# Patient Record
Sex: FEMALE | Race: OTHER | Employment: PART TIME | URBAN - METROPOLITAN AREA
[De-identification: names, ages, dates, MRNs, and addresses within clinical notes are randomized per-mention and may not be internally consistent; named-entity substitution may affect disease eponyms.]

---

## 2017-02-16 ENCOUNTER — HOSPITAL ENCOUNTER (OUTPATIENT)
Dept: LAB | Age: 42
Discharge: HOME | End: 2017-02-16

## 2017-02-16 ENCOUNTER — OFFICE VISIT (OUTPATIENT)
Dept: FAMILY MEDICINE CLINIC | Age: 42
End: 2017-02-16

## 2017-02-16 VITALS
HEIGHT: 62 IN | WEIGHT: 203 LBS | SYSTOLIC BLOOD PRESSURE: 184 MMHG | BODY MASS INDEX: 37.36 KG/M2 | HEART RATE: 66 BPM | TEMPERATURE: 97.8 F | DIASTOLIC BLOOD PRESSURE: 109 MMHG

## 2017-02-16 DIAGNOSIS — Z13.9 SCREENING: Primary | ICD-10-CM

## 2017-02-16 DIAGNOSIS — I10 ESSENTIAL HYPERTENSION: ICD-10-CM

## 2017-02-16 DIAGNOSIS — Z23 ENCOUNTER FOR IMMUNIZATION: ICD-10-CM

## 2017-02-16 LAB
ALBUMIN SERPL BCP-MCNC: 4 G/DL (ref 3.5–5)
ALBUMIN/GLOB SERPL: 1.1 {RATIO} (ref 1.1–2.2)
ALP SERPL-CCNC: 101 U/L (ref 45–117)
ALT SERPL-CCNC: 50 U/L (ref 12–78)
ANION GAP BLD CALC-SCNC: 9 MMOL/L (ref 5–15)
AST SERPL W P-5'-P-CCNC: 33 U/L (ref 15–37)
BILIRUB SERPL-MCNC: 0.2 MG/DL (ref 0.2–1)
BUN SERPL-MCNC: 13 MG/DL (ref 6–20)
BUN/CREAT SERPL: 18 (ref 12–20)
CALCIUM SERPL-MCNC: 8.7 MG/DL (ref 8.5–10.1)
CHLORIDE SERPL-SCNC: 104 MMOL/L (ref 97–108)
CHOLEST SERPL-MCNC: 223 MG/DL
CO2 SERPL-SCNC: 27 MMOL/L (ref 21–32)
CREAT SERPL-MCNC: 0.74 MG/DL (ref 0.55–1.02)
GLOBULIN SER CALC-MCNC: 3.7 G/DL (ref 2–4)
GLUCOSE POC: NORMAL MG/DL
GLUCOSE SERPL-MCNC: 77 MG/DL (ref 65–100)
HDLC SERPL-MCNC: 56 MG/DL
HDLC SERPL: 4 {RATIO} (ref 0–5)
LDLC SERPL CALC-MCNC: 148.4 MG/DL (ref 0–100)
LIPID PROFILE,FLP: ABNORMAL
POTASSIUM SERPL-SCNC: 4.4 MMOL/L (ref 3.5–5.1)
PROT SERPL-MCNC: 7.7 G/DL (ref 6.4–8.2)
SODIUM SERPL-SCNC: 140 MMOL/L (ref 136–145)
TRIGL SERPL-MCNC: 93 MG/DL (ref ?–150)
VLDLC SERPL CALC-MCNC: 18.6 MG/DL

## 2017-02-16 PROCEDURE — 80061 LIPID PANEL: CPT | Performed by: FAMILY MEDICINE

## 2017-02-16 PROCEDURE — 80053 COMPREHEN METABOLIC PANEL: CPT | Performed by: FAMILY MEDICINE

## 2017-02-16 RX ORDER — HYDROCHLOROTHIAZIDE 25 MG/1
25 TABLET ORAL DAILY
Qty: 90 TAB | Refills: 0 | Status: SHIPPED | OUTPATIENT
Start: 2017-02-16 | End: 2017-04-18 | Stop reason: SDUPTHER

## 2017-02-16 NOTE — MR AVS SNAPSHOT
Visit Information Parmjit Byrdjoselinh Personal Médico Departamento Teléfono del Dep. Número de visita 2/16/2017  8:45 AM Morgan Ayala MD [address] [phone] Follow-up Instructions Return for 2 months appt less busy site. Your Appointments 4/18/2017  9:00 AM  
Follow Up with Morgan Ayala MD  
[facility]- [address] ([facility]) Appt Note: 2 month follow up  
 [address] [facility] [address] [facility] 7 [address] [phone]  
  
   
 [address] [place] [address] [address] [facility] 7 [address] Upcoming Health Maintenance Date Due DTaP/Tdap/Td series (1 - Tdap) 12/15/1998 INFLUENZA AGE 9 TO ADULT 8/1/2016 PAP AKA CERVICAL CYTOLOGY 4/13/2019 Alergias  Review Complete El: 5/24/2016 Por: ROSSY Pike  
 A partir del:  2/16/2017 No Known Allergies Vacunas actuales Barbi Shoulders No hay ninguna vacuna archivada. No revisadas esta visita You Were Diagnosed With   
  
 Clent Oas Screening    -  Primary ICD-10-CM: Z13.9 ICD-9-CM: V82.9 Essential hypertension     ICD-10-CM: I10 
ICD-9-CM: 401.9 Partes vitales PS Pulso Temperatura Sawyer ( percentil de crecimiento) Peso (percentil de crecimiento) BMI (C)  
 (!) 184/109 (BP 1 Location: Left arm, BP Patient Position: Sitting) 66 97.8 °F (36.6 °C) (Oral) 5' 1.91\" (1.572 m) 203 lb (92.1 kg) 37.24 kg/m2 Estado obstétrico Estatus de tabaquísmo Unknown Never Smoker Historial de signos vitales BMI and BSA Data Body Mass Index Body Surface Area  
 37.24 kg/m 2 2.01 m 2 [facility] Pharmacy Name Phone [facility] [address] [facility] [phone] Benitez lista de medicamentos actualizada Lista actualizada el: 2/16/17 11:19 AM.  Sachin Medina use benitez lista de medicamentos más reciente. hydroCHLOROthiazide 25 mg tablet También conocido nj:  HYDRODIURIL Take 1 Tab by mouth daily. Recetas Enviado a la [place] Refills  
 hydroCHLOROthiazide (HYDRODIURIL) 25 mg tablet 0 Sig: Take 1 Tab by mouth daily. Class: Normal  
 Pharmacy: [address] [place] [address] Ph #: [phone] Route: Oral  
  
Hicimos lo siguiente AMB POC GLUCOSE BLOOD, BY GLUCOSE MONITORING DEVICE [15268 CPT(R)] Instrucciones de seguimiento Return for 2 months appt less busy site. Introducing [facility]! [facility] introduce portal paciente MyChart . Ahora se puede acceder a partes de li expediente médico, enviar por correo electrónico la oficina de li médico y solicitar renovaciones de medicamentos en línea. En li navegador de Internet , Meghan Loera a [email or web address] Indira clic en el usuario por Dajuan Renan? [place] Short clic aquí en la sesión [facility]. Verá la página de registro [place]. Ingrese li código de [facility] Krystal luke y nj aparece a continuación. Usted no tendrá que [employer] código después de kirk completado el proceso de registro . Si usted no se inscribe antes de la fecha de caducidad , debe solicitar un nuevo código. · MyChart Código de acceso : [id number] Expires: 5/17/2017 11:19 AM 
 
Ingresa los últimos cuatro dígitos de li Número de Seguro Social ( xxxx ) y fecha de nacimiento ( dd / mm / aaaa ) nj se indica y indira clic en Enviar. Usted será llevado a la siguiente página de registro . Crear un ID MyCLahaina . Esta será li ID de inicio de sesión de MyChart y no puede ser [place] , por lo que pensar en adriana que es Selinda Halie y fácil de recordar . Crear adriana contraseña MyChart . Usted puede cambiar li contraseña en cualquier momento . Ingrese li Password Reset de preguntas y [place] . [place] se puede utilizar en un momento posterior si usted olvida li contraseña. Introduzca li dirección de correo electrónico . Tyaler Graves recibirá adriana notificación por correo electrónico cuando la nueva información está disponible en MyChart . Juan Ready clic en Registrarse. Mullens Schirmer flora y descargar porciones de li expediente médico. 
Michelle clic en el enlace de descarga del menú Resumen para descargar adriana copia portátil de li información médica . Si tiene [employer] , por favor visite la sección de preguntas frecuentes del sitio web [employer]t . Recuerde, [employer] NO es que se utilizará para las necesidades urgentes. Para emergencias médicas , llame al 911 . Ahora disponible en li iPhone y Android ! Por favor proporcione shivam resumen de la documentación de cuidado a li próximo proveedor. If you have any questions after today's visit, please call [phone].

## 2017-02-17 NOTE — PROGRESS NOTES
Very high cholesterol. Wt loss and diet should help. We will recheck over the next few visits. Important to keep appt with nutrition.

## 2017-02-24 NOTE — PROGRESS NOTES
Attempted to reach patient with the assistance of [employer]  # [phone]. There was no answer on the home/cell phone number so a generic message was left to please call the [facility] office.  Isael Flores RN

## 2017-03-02 ENCOUNTER — TELEPHONE (OUTPATIENT)
Dept: FAMILY MEDICINE CLINIC | Age: 42
End: 2017-03-02

## 2017-03-02 NOTE — PROGRESS NOTES
RN called pt with the assistance of  # [phone]. RN explained to pt that her lipid results indicated high cholesterol. RN explained that exercise should help to bring this down, and to attempt to exercise at least 30 minutes daily. RN also reviewed low fat, low cholesterol diet recommendations with pt. Reminded pt to attend her appt with the nutritionist and also follow up appt in April, 2017. She plans to do so. Pt expressed understanding of the above information. Vesna Tariq.

## 2017-03-16 ENCOUNTER — OFFICE VISIT (OUTPATIENT)
Dept: FAMILY MEDICINE CLINIC | Age: 42
End: 2017-03-16

## 2017-03-16 DIAGNOSIS — Z71.3 DIETARY COUNSELING AND SURVEILLANCE: Primary | ICD-10-CM

## 2017-04-18 ENCOUNTER — OFFICE VISIT (OUTPATIENT)
Dept: FAMILY MEDICINE CLINIC | Age: 42
End: 2017-04-18

## 2017-04-18 VITALS
TEMPERATURE: 98.6 F | HEART RATE: 81 BPM | BODY MASS INDEX: 36.87 KG/M2 | DIASTOLIC BLOOD PRESSURE: 89 MMHG | SYSTOLIC BLOOD PRESSURE: 142 MMHG | WEIGHT: 201 LBS

## 2017-04-18 DIAGNOSIS — I10 ESSENTIAL HYPERTENSION: ICD-10-CM

## 2017-04-18 RX ORDER — HYDROCHLOROTHIAZIDE 25 MG/1
25 TABLET ORAL DAILY
Qty: 90 TAB | Refills: 1 | Status: SHIPPED | OUTPATIENT
Start: 2017-04-18 | End: 2017-08-24 | Stop reason: SDUPTHER

## 2017-04-18 NOTE — PROGRESS NOTES
Subjective: Parveen Carvalho is a 46 y.o. female who presents for follow up of htn. Taking hctz as directed and has started working out at the gym 5 days a week, aerobic activities. ROS: taking medications as instructed, no medication side effects noted, no swelling of ankles. New concerns:  wonders if she needs to keep taking the med for htn. .     Patient Active Problem List   Diagnosis Code    Essential hypertension I10     No family history on file. Social History   Substance Use Topics    Smoking status: Never Smoker    Smokeless tobacco: Not on file    Alcohol use No        Lab Results  Component Value Date/Time   Cholesterol, total 223 02/16/2017 10:27 AM   HDL Cholesterol 56 02/16/2017 10:27 AM   LDL, calculated 148.4 02/16/2017 10:27 AM   Triglyceride 93 02/16/2017 10:27 AM   CHOL/HDL Ratio 4.0 02/16/2017 10:27 AM       Lab Results  Component Value Date/Time   GFR est AA >60 02/16/2017 10:27 AM   GFR est non-AA >60 02/16/2017 10:27 AM   Creatinine 0.74 02/16/2017 10:27 AM   BUN 13 02/16/2017 10:27 AM   Sodium 140 02/16/2017 10:27 AM   Potassium 4.4 02/16/2017 10:27 AM   Chloride 104 02/16/2017 10:27 AM   CO2 27 02/16/2017 10:27 AM        Objective:     Vitals 4/18/2017 2/16/2017 2/16/2017 5/24/2016 5/24/2016 5/24/2016 4/13/2016   Blood Pressure 142/89 184/109 185/112 142/89 156/101 - 146/95   Pulse 81 66 67 92 91 - 84   Temp 98.6 - 97.8 - 98.7 - -   Height - - 5' 1.909\" - - - -   Weight 201 lb - 203 lb - 201 lb - -   BSA - - 2.01 m2 - - - -   BMI - - 37.24 kg/m2 - - - -   BP comment - - - - - RA -       Appearance: alert, well appearing, and in no distress and overweight. Wt down 2 lb. General exam: none done. .  Lab review:     Assessment/Plan:     Htn.  bp much improved but not at goal.  rec continued med, exercise and weight loss and we may be able to decrease dose of hctz in the future.

## 2017-04-18 NOTE — PROGRESS NOTES
Ms. Glen Alvarez was referred to [facility] for nutrition education. Estephania is interpreting for this appt. Current weight 201 lbs  BMI 36.8 classifying pt as obesity class 2  Ms. Glen Alvarez states that she has made changes since visiting with the provider that include: no snacking, going to the gym 5 days/week for 2 hours  Ms. Glen Alvarez is eating 1 balanced meal/day plus small meal of cereal and milk  RD discussed the importance of fiber in the body. Discussed fiber sources of whole grains, vegetables and fruits. Introduced the MyPlate and emphasized to pt that using the plate as a guide to meal planning can both help her to lose weight and get the variety that she needs. Pt agrees to start with breakfast meal. RD offered suggestions and pt had some of her own. Pt will have breakfast at least 3 mornings/week that mirror MyPlate guidelines.

## 2017-04-18 NOTE — PROGRESS NOTES
Coordination of Care  1. Have you been to the ER, urgent care clinic since your last visit? Hospitalized since your last visit? No    2. Have you seen or consulted any other health care providers outside of the [facility] since your last visit? Include any pap smears or colon screening. No    Medications  Medication Reconciliation Performed: no  Patient does need refills     Learning Assessment Complete?  yes

## 2017-08-24 ENCOUNTER — TELEPHONE (OUTPATIENT)
Dept: FAMILY MEDICINE CLINIC | Age: 42
End: 2017-08-24

## 2017-08-24 DIAGNOSIS — I10 ESSENTIAL HYPERTENSION: ICD-10-CM

## 2017-08-24 RX ORDER — HYDROCHLOROTHIAZIDE 25 MG/1
25 TABLET ORAL DAILY
Qty: 90 TAB | Refills: 0 | Status: SHIPPED | OUTPATIENT
Start: 2017-08-24 | End: 2017-09-28 | Stop reason: SDUPTHER

## 2017-08-24 NOTE — TELEPHONE ENCOUNTER
Patient is out of her meds. Please refill. She also said that her doctor had recommended that she call are office after three months for a check f/u? And needs an appointment?

## 2017-08-25 NOTE — TELEPHONE ENCOUNTER
Attempted a telephone call to the pt. No answer. A generic message to call the [facility] office back was left on her VM. The pt was being called to let her know about her medication refill at her Pharmacy and to assist her in making an appt to see the provider for a BP check. Int. # [id number], assisted with making the call.  Torrey Rachel RN

## 2017-08-29 NOTE — TELEPHONE ENCOUNTER
Called to pt. Reviewed e script sent to her [address] for BP medication on 8/24/17 and she should go to pick it up. Instructed to check her BP at the [facility], write it down and check it a few times  And bring to clinic visit. Scheduled for next available appt w/ Dr Shonna Paz on 9/28/17 at [facility] at 11 am. Appt confirmation placed in mail. Address confirmed. Call assisted by [employer] #[id number].

## 2017-09-28 ENCOUNTER — OFFICE VISIT (OUTPATIENT)
Dept: FAMILY MEDICINE CLINIC | Age: 42
End: 2017-09-28

## 2017-09-28 VITALS
HEART RATE: 67 BPM | HEIGHT: 62 IN | BODY MASS INDEX: 34.96 KG/M2 | SYSTOLIC BLOOD PRESSURE: 153 MMHG | WEIGHT: 190 LBS | DIASTOLIC BLOOD PRESSURE: 84 MMHG | TEMPERATURE: 98.9 F

## 2017-09-28 DIAGNOSIS — N92.6 MISSED PERIOD: ICD-10-CM

## 2017-09-28 DIAGNOSIS — I10 ESSENTIAL HYPERTENSION: Primary | ICD-10-CM

## 2017-09-28 LAB
HCG URINE, QL. (POC): NEGATIVE
VALID INTERNAL CONTROL?: YES

## 2017-09-28 RX ORDER — HYDROCHLOROTHIAZIDE 25 MG/1
25 TABLET ORAL DAILY
Qty: 90 TAB | Refills: 3 | Status: SHIPPED | OUTPATIENT
Start: 2017-09-28 | End: 2017-10-10 | Stop reason: ALTCHOICE

## 2017-09-28 NOTE — PROGRESS NOTES
Subjective: Eleonora Matthews is a 46 y.o. female who presents for follow up of htn. Hasn't been exercising lately because her mom broke her arm and she has been caring for her. Despite that, she has been able to lose 11 lb. Feels well. Diet and Lifestyle: exercises sporadically    Cardiovascular ROS: taking medications as instructed, no medication side effects noted, no dyspnea on exertion, no swelling of ankles. New concerns: has appt for pap next week, wants chol recheck since the one in feb was high. . Missed a period but menses have always been irreg.     Patient Active Problem List   Diagnosis Code    Essential hypertension I10     No family history on file.   Social History   Substance Use Topics    Smoking status: Never Smoker    Smokeless tobacco: Never Used    Alcohol use No        Lab Results  Component Value Date/Time   Cholesterol, total 223 2017 10:27 AM   HDL Cholesterol 56 2017 10:27 AM   LDL, calculated 148.4 2017 10:27 AM   Triglyceride 93 2017 10:27 AM   CHOL/HDL Ratio 4.0 2017 10:27 AM     Lab Results  Component Value Date/Time   GFR est non-AA >60 2017 10:27 AM   GFR est AA >60 2017 10:27 AM   Creatinine 0.74 2017 10:27 AM   BUN 13 2017 10:27 AM   Sodium 140 2017 10:27 AM   Potassium 4.4 2017 10:27 AM   Chloride 104 2017 10:27 AM   CO2 27 2017 10:27 AM     Lab Results   Component Value Date/Time    Hemoglobin A1c 5.4 2016 11:19 AM                   Objective:   Vitals 2017   Blood Pressure 153/84 142/89 184/109 185/112 142/89 156/101 -   Pulse 67 81 66 67 92 91 -   Temp 98.9 98.6 - 97.8 - 98.7 -   Height 5' 1.811\" - - 5' 1.909\" - - -   Weight 190 lb 201 lb - 203 lb - 201 lb -   BSA 1.94 m2 - - 2.01 m2 - - -   BMI 34.96 kg/m2 - - 37.24 kg/m2 - - -   BP comment - - - - - - RA       Appearance: alert, well appearing, and in no distress. General exam: CVS exam BP noted to be mildly elevated today in office, S1, S2 normal, no gallop, no murmur, chest clear, no JVD, no HSM, no edema. Lab review:     Assessment/Plan:     hypertension stable. current treatment plan is effective, no change in therapy. F/u fasting for lipids. Get back to exercising when possible. irreg menses, on no bc. Pregnancy test neg. ICD-10-CM ICD-9-CM    1. Missed period N92.6 626.4 AMB POC URINE PREGNANCY TEST, VISUAL COLOR COMPARISON      LIPID PANEL   2.  Essential hypertension I10 401.9 hydroCHLOROthiazide (HYDRODIURIL) 25 mg tablet

## 2017-09-28 NOTE — PROGRESS NOTES
Coordination of Care  1. Have you been to the ER, urgent care clinic since your last visit? Hospitalized since your last visit? No    2. Have you seen or consulted any other health care providers outside of the [address] [place] [address] since your last visit? Include any pap smears or colon screening. No    Medications  Medication Reconciliation Performed: no  Patient does not know need refills     Learning Assessment Complete?  yes

## 2017-10-03 ENCOUNTER — HOSPITAL ENCOUNTER (OUTPATIENT)
Dept: LAB | Age: 42
Discharge: HOME | End: 2017-10-03

## 2017-10-03 ENCOUNTER — OFFICE VISIT (OUTPATIENT)
Dept: FAMILY MEDICINE CLINIC | Age: 42
End: 2017-10-03

## 2017-10-03 VITALS
BODY MASS INDEX: 35.33 KG/M2 | WEIGHT: 192 LBS | TEMPERATURE: 98.7 F | HEART RATE: 69 BPM | SYSTOLIC BLOOD PRESSURE: 140 MMHG | DIASTOLIC BLOOD PRESSURE: 85 MMHG

## 2017-10-03 DIAGNOSIS — Z13.9 ENCOUNTER FOR SCREENING: ICD-10-CM

## 2017-10-03 DIAGNOSIS — Z13.220 SCREENING FOR CHOLESTEROL LEVEL: ICD-10-CM

## 2017-10-03 DIAGNOSIS — R10.2 PELVIC PAIN: ICD-10-CM

## 2017-10-03 DIAGNOSIS — O09.521 AMA (ADVANCED MATERNAL AGE) MULTIGRAVIDA 35+, FIRST TRIMESTER: ICD-10-CM

## 2017-10-03 DIAGNOSIS — Z01.419 WELL WOMAN EXAM: Primary | ICD-10-CM

## 2017-10-03 DIAGNOSIS — N92.6 MISSED PERIOD: ICD-10-CM

## 2017-10-03 LAB
CHOLEST SERPL-MCNC: 198 MG/DL
HCG URINE, QL. (POC): POSITIVE
HDLC SERPL-MCNC: 46 MG/DL
HDLC SERPL: 4.3 {RATIO} (ref 0–5)
LDLC SERPL CALC-MCNC: 136.6 MG/DL (ref 0–100)
LIPID PROFILE,FLP: ABNORMAL
TRIGL SERPL-MCNC: 77 MG/DL (ref ?–150)
VALID INTERNAL CONTROL?: YES
VLDLC SERPL CALC-MCNC: 15.4 MG/DL

## 2017-10-03 PROCEDURE — 87491 CHLMYD TRACH DNA AMP PROBE: CPT | Performed by: PHYSICIAN ASSISTANT

## 2017-10-03 PROCEDURE — 87591 N.GONORRHOEAE DNA AMP PROB: CPT | Performed by: PHYSICIAN ASSISTANT

## 2017-10-03 PROCEDURE — 36415 COLL VENOUS BLD VENIPUNCTURE: CPT | Performed by: PHYSICIAN ASSISTANT

## 2017-10-03 PROCEDURE — 80061 LIPID PANEL: CPT | Performed by: PHYSICIAN ASSISTANT

## 2017-10-03 NOTE — MR AVS SNAPSHOT
Visit Information Matt Mckenna Personal Médico Departamento Teléfono del Dep. Número de visita 10/3/2017 11:00 AM Joss Turcios [address], 307 [facility] [phone] Follow-up Instructions Return in about 3 months (around 1/3/2018). Your Appointments 10/5/2017  1:35 PM  
FINANCIAL SCREENING with [facility] [address] ([facility]) Appt Note: 4 pay stubs [address] [place] [address] Rd  
  
   
 [address] [facility] Upcoming Health Maintenance Date Due DTaP/Tdap/Td series (1 - Tdap) 12/15/1998 INFLUENZA AGE 9 TO ADULT 8/1/2017 PAP AKA CERVICAL CYTOLOGY 4/13/2019 Alergias  Review Complete El: 10/3/2017 Por: Vernida Lovelock A partir del:  10/3/2017 No Known Allergies Vacunas actuales Leory Flores Tim Shawl Influenza Vaccine (Quad) PF 2/16/2017 No revisadas esta visita You Were Diagnosed With   
  
 Nikita García Well woman exam    -  Primary ICD-10-CM: Z45.234 ICD-9-CM: V72.31 Missed period     ICD-10-CM: N92.6 ICD-9-CM: 626.4 Pelvic pain     ICD-10-CM: R10.2 ICD-9-CM: [id number] Screening for cholesterol level     ICD-10-CM: Z13.220 ICD-9-CM: V77.91 Encounter for screening     ICD-10-CM: Z13.9 ICD-9-CM: V82.9 AMA (advanced maternal age) multigravida 33+, first trimester     ICD-10-CM: O09.521 ICD-9-CM: 004.37 Partes vitales PS Pulso Temperatura Peso (percentil de crecimiento) LMP (última dudley) BMI (IMC)  
 140/85 (BP 1 Location: Left arm, BP Patient Position: Sitting) 69 98.7 °F (37.1 °C) (Oral) 192 lb (87.1 kg) (Within Months) 35.33 kg/m2 Estado obstétrico Estatus de tabaquísmo Having regular periods Never Smoker Historial de signos vitales BMI and BSA Data  Body Mass Index Body Surface Area  
 35.33 kg/m 2 1.95 m 2  
  
  
 [facility] Name Phone [facility] [address] [facility] [phone] Benitez lista de medicamentos actualizada Lista actualizada el: 10/3/17 11:23 AM.  Castro Collier use benitez lista de medicamentos más reciente.  
  
  
  
  
 hydroCHLOROthiazide 25 mg tablet También conocido nj:  HYDRODIURIL Take 1 Tab by mouth daily. prenatal vit-calcium-iron-fa 29 mg iron- 1 mg Tab tablet También conocido nj:  PRENATAL PLUS Take 1 Tab by mouth daily. Please sub with $4 formulary Recetas Enviado a la [place] Refills  
 prenatal vit-calcium-iron-fa (PRENATAL PLUS) 29 mg iron- 1 mg tab tablet 5 Sig: Take 1 Tab by mouth daily. Please sub with $4 formulary Class: Normal  
 Pharmacy: [address] [facility] [address], [address] [facility] #: [phone] Route: Oral  
  
Hicimos lo siguiente AMB POC URINE PREGNANCY TEST, VISUAL COLOR COMPARISON [07429 CPT(R)] PAP, LB, RFX HPV UKJAR([id number]) [id number] CPT(R)] REFERRAL TO OBSTETRICS AND GYNECOLOGY [REF51 Custom] Instrucciones de seguimiento Return in about 3 months (around 1/3/2018). Por hacer 10/03/2017 Lab:  Sim Nolasco / Melanie Solano   
  
 10/03/2017 Lab:  LIPID PANEL   
  
 10/03/2017 Imaging:  US PREG UTS < 14 WKS SNGL   
  
 10/03/2017 Imaging:  US UTS TRANSVAGINAL OB Informacion de [employer]igo de Referencia Referido por Referido a  
  
 [id number] CHARLES PAZ No disponible Visitas Estado Fecha de inicio Orjosiasia Gilma final  
 1 New Request 10/3/17 10/3/18 Si benitez referencia tiene un estado de \"pending review\" o \"denied\" , informacion adicional sera enviada para apoyar el resultado de esta decision. Instrucciones para el Paciente Edad materna avanzada: Instrucciones de cuidado - [ Advanced Maternal Age: Care Instructions ] Instrucciones de cuidado Edad materna avanzada es el término médico utilizado para referirse al embarazo de adriana juan que tendrá 28 años o más en el momento del [place]. La mayoría de las mujeres de esta edad tienen bebés sanos. Helen Deja Counter a esta edad implica un mayor riesgo de tener problemas que un embarazo a adriana edad más temprana. Estos problemas incluyen parto prematuro y preeclampsia. También incluyen diabetes gestacional, problemas con la placenta y problemas genéticos. La mayoría de estos problemas no se pueden prevenir. Helen es mejor detectar cualquier problema oportunamente. Por eso, il médico querrá hacerle pruebas para detectar diabetes. También revisará li presión arterial y le analizará la orina en cada visita. La presión arterial surya y las proteínas en la orina son señales de preeclampsia. Usted puede decidir si quiere hacerse pruebas para determinar si el feto tiene determinados problemas genéticos, nj síndrome de Down. Feto es el término médico para referirse a un bebé antes del nacimiento. Hay muchas cosas que usted no puede controlar en el embarazo. Helen hay muchas cosas que usted puede hacer para ayudar a que tenga un embarazo saludable. Trate de hacer lo que pueda para alimentarse jeniffer. Y trate de hacer bastante ejercicio y descansar lo suficiente. La atención de seguimiento es adriana parte clave de li tratamiento y seguridad. Asegúrese de hacer y acudir a todas las citas, y llame a li médico si está teniendo problemas. También es adriana buena idea saber los resultados de los exámenes y mantener adriana lista de los medicamentos que kristi. Cómo puede cuidarse en el hogar? · Hable con li médico acerca de las pruebas de detección prenatal. Estas pueden ayudar a detectar el síndrome de Down y otros posibles problemas. · Consuma adriana dieta equilibrada con suficiente proteína, calcio y xi. Asegúrese de incluir frutas, verduras y granos integrales. · Hable con li médico acerca de un plan de ejercicio.  Nesbitt David embarazadas disfrutan de caminar, nadar y practicar yoga prenatal. 
· Asegúrese de lambert suficiente ácido fólico. El ácido fólico ayuda a prevenir algunos defectos congénitos (de nacimiento), sobre todo si lo kristi antes de Tyrese Fail. Li médico le dirá cuánto necesita. Puede lambert pastillas de ácido fólico. 
· Compo stephie vitaminas prenatales. · Barbara abundantes líquidos, los suficientes nj para que li orina sea de color amarillo michelle o transparente nj el agua. La deshidratación puede causar contracciones. Si tiene [place] [facility], del corazón o del hígado y tiene que Melvin's líquidos, hable con li médico antes de aumentar li consumo. · Consulte con li médico o farmacéutico antes de lambert medicamentos de venta isael, vitaminas, suplementos herbarios o lisa caseros. · No barbara alcohol. No se ha comprobado que ninguna cantidad de alcohol sea riggs sandeep el embarazo. · No fume. Si necesita ayuda para dejar de fumar, hable con li médico sobre programas y medicamentos para dejar de fumar. Estos pueden aumentar stephie probabilidades de dejar el hábito para siempre. Cuándo debe pedir ayuda? Llame al 911 en cualquier momento que considere que necesita atención de [place]. Por ejemplo, llame si: 
· Se desmayó (perdió el conocimiento). · Tiene dolor abdominal repentino e intenso. · Le ha goteado o salido abundante líquido de la vagina y sabe o ramya que el cordón umbilical le está saliendo de la vagina. Si esto sucede, arrodíllese de inmediato, de luke forma que stephie nalgas estén más altas que li flo. [place] disminuirá la presión sobre el cordón umbilical hasta que llegue la [facility]. Llame a li médico ahora mismo o busque atención médica inmediata si: · Tiene señales de preeclampsia, tales nj: ¨ Se le hinchan de manera repentina la clem, las sabas o los pies. ¨ Problemas nuevos con la visión, nj oscurecimiento de la visión o visión borrosa. ¨ Dolor de flo intenso. · Tiene cualquier sangrado vaginal. 
· Tiene dolor abdominal o cólicos. · Tiene fiebre. · Ha tenido contracciones regulares (con o sin dolor) por [place]. [place] significa que tiene 8 o más contracciones en 1 hora o que tiene 4 contracciones o más en 20 minutos después de [place] de posición y lambert líquidos. · Le sale líquido de la vagina de manera repentina. · Tiene dolor en la parte baja de la espalda o presión en la pelvis que no desaparece. · Nota que li bebé ha dejado de moverse o se mueve mucho menos de lo normal. 
· Tiene flujo vaginal con un olor desagradable. · Tiene vómito intenso con dolor o fiebre, vomita 3 veces o más en el día, o vomita sandeep más de 1 hora cada día. Preste especial atención a los cambios en li chan y asegúrese de comunicarse con li médico si tiene algún problema. Dónde puede encontrar más información en inglés? Eugene Bolanos a http://emily-aislinn.info/. Escriba C333 en la búsqueda para aprender más acerca de \"Edad materna avanzada: Instrucciones de cuidado - [ Advanced Maternal Age: Care Instructions ]. \" 
Revisado: 16 marzo, 2017 Versión del contenido: 11.3 © 9195-3880 Healthwise, Incorporated. Las instrucciones de cuidado fueron adaptadas bajo licencia por Good Help Connections (which disclaims liability or warranty for this information). Si usted tiene [place] afección médica o sobre estas instrucciones, siempre pregunte a li profesional de chan. Healthwise, Incorporated niega toda garantía o responsabilidad por li uso de esta información. Introducing [facility]! [facility] introduce portal paciente MyChart . Ahora se puede acceder a partes de li expediente médico, enviar por correo electrónico la oficina de li médico y solicitar renovaciones de medicamentos en línea. En li navegador de Internet , [place]ipe[place] a [email or web address] Michelle clkwame en el usuario por Lychristianea Mohinder?  Stephany Rideau clic aquí en la Jonathan Platt. Verá la página de registro Reader. Ingrese li código de [employer] luke y nj aparece a continuación. Usted no tendrá que [employer] código después de kirk completado el proceso de registro . Si usted no se inscribe antes de la fecha de caducidad , debe solicitar un nuevo código. · MyChart Código de acceso : [id number] Expires: 1/1/2018 11:23 AM 
 
Ingresa los últimos cuatro dígitos de li Número de Seguro Social ( xxxx ) y fecha de nacimiento ( dd / mm / aaaa ) nj se indica y michelle clic en Enviar. Usted será llevado a la siguiente página de registro . Crear un ID MyChart . Esta será li ID de inicio de sesión de MyChart y no puede ser [place] , por lo que pensar en adriana que es [place] y fácil de recordar . Crear adriana contraseña MyChart . Usted puede cambiar li contraseña en cualquier momento . Ingrese li Password Reset de preguntas y [place] . [place] se puede utilizar en un momento posterior si usted olvida li contraseña. Introduzca li dirección de correo electrónico . Radha Paulina recibirá adriana notificación por correo electrónico cuando la nueva información está disponible en MyChart . Miguel Perfect clic en Registrarse. Shefali Rhyme flora y descargar porciones de li expediente médico. 
Michelle clic en el enlace de descarga del menú Resumen para descargar adriana copia portátil de li información médica . Si tiene [employer] , por favor visite la sección de preguntas frecuentes del sitio web MyChart . Recuerde, MyChart NO es que se utilizará para las necesidades urgentes. Para emergencias médicas , llame al 911 . Ahora disponible en li iPhone y Android ! Por favor proporcione shivam resumen de la documentación de cuidado a li próximo proveedor. If you have any questions after today's visit, please call [phone].

## 2017-10-03 NOTE — PROGRESS NOTES
Spoke to the patient using Randi Holloway as an . Gave pt an appointment to be seen at [address] [place] [address] on 10/10 at 8.30 am.  Office is located at [address] [place] [address], [address] [facility] [address], [address] [facility]. Explained that they will do the US at their office. Gave pt results of pregnancy test to take to [facility] office. Explained about the  Care Card to the patient, gave her APA phone number. to call to start the Care Card process.

## 2017-10-03 NOTE — PROGRESS NOTES
Coordination of Care  1. Have you been to the ER, urgent care clinic since your last visit? Hospitalized since your last visit? No    2. Have you seen or consulted any other health care providers outside of the [address] [place] [address] since your last visit? Include any pap smears or colon screening.  No    Medications  Medication Reconciliation Performed: yes  Patient does not know need refills     Learning Assessment Complete? yes  Results for orders placed or performed in visit on 10/03/17   AMB POC URINE PREGNANCY TEST, VISUAL COLOR COMPARISON   Result Value Ref Range    VALID INTERNAL CONTROL POC Yes     HCG urine, Ql. (POC) Positive Negative

## 2017-10-03 NOTE — PATIENT INSTRUCTIONS
Edad materna avanzada: Instrucciones de cuidado - [ Advanced Maternal Age: Care Instructions ]  Instrucciones de cuidado  Edad materna avanzada es el término médico utilizado para referirse al embarazo de adriana juan que tendrá 28 años o más en el momento del [place]. La mayoría de las mujeres de esta edad tienen bebés sanos. Helen Ted Romeo a esta edad implica un mayor riesgo de tener problemas que un embarazo a adriana edad más temprana. Estos problemas incluyen parto prematuro y preeclampsia. También incluyen diabetes gestacional, problemas con la placenta y problemas genéticos. La mayoría de estos problemas no se pueden prevenir. Helen es mejor detectar cualquier problema oportunamente. Por eso, li médico querrá hacerle pruebas para detectar diabetes. También revisará li presión arterial y le analizará la orina en cada visita. La presión arterial surya y las proteínas en la orina son señales de preeclampsia. Usted puede decidir si quiere hacerse pruebas para determinar si el feto tiene determinados problemas genéticos, nj síndrome de Down. Feto es el término médico para referirse a un bebé antes del nacimiento. Hay muchas cosas que usted no puede controlar en el embarazo. Helen hay muchas cosas que usted puede hacer para ayudar a que tenga un embarazo saludable. Trate de hacer lo que pueda para alimentarse jeniffer. Y trate de hacer bastante ejercicio y descansar lo suficiente. La atención de seguimiento es adriana parte clave de li tratamiento y seguridad. Asegúrese de hacer y acudir a todas las citas, y llame a li médico si está teniendo problemas. También es adriana buena idea saber los resultados de los exámenes y mantener adriana lista de los medicamentos que kristi. ¿Cómo puede cuidarse en el hogar? · Hable con li médico acerca de las pruebas de detección prenatal. Estas pueden ayudar a detectar el síndrome de Down y otros posibles problemas. · Consuma adriana dieta equilibrada con suficiente proteína, calcio y xi. Asegúrese de incluir frutas, verduras y granos integrales. · Hable con li médico acerca de un plan de ejercicio. Muchas mujeres embarazadas disfrutan de caminar, nadar y practicar yoga prenatal.  · Asegúrese de lambert suficiente ácido fólico. El ácido fólico ayuda a prevenir algunos defectos congénitos (de nacimiento), sobre todo si lo kristi antes de Madiha Quiros. Li médico le dirá cuánto necesita. Puede lambert pastillas de ácido fólico.  · [place] stephie vitaminas prenatales. · Barbara abundantes líquidos, los suficientes nj para que li orina sea de color amarillo michelle o transparente nj el agua. La deshidratación puede causar contracciones. Si tiene [place] [facility] [employer], del corazón o del [facility] y tiene que [place]'s líquidos, hable con li médico antes de aumentar li consumo. · Consulte con li médico o farmacéutico antes de lambert medicamentos de venta isael, vitaminas, suplementos herbarios o lisa caseros. · No barbara alcohol. No se ha comprobado que ninguna cantidad de alcohol sea riggs sandeep el embarazo. · No fume. Si necesita ayuda para dejar de fumar, hable con li médico sobre programas y medicamentos para dejar de fumar. Estos pueden aumentar stephie probabilidades de dejar el hábito para siempre. ¿Cuándo debe pedir ayuda? Llame al 911 en cualquier momento que considere que necesita atención de [place]. Por ejemplo, llame si:  · Se desmayó (perdió el conocimiento). · Tiene dolor abdominal repentino e intenso. · Le ha goteado o salido abundante líquido de la vagina y sabe o ramya que el cordón umbilical le está saliendo de la vagina. Si esto sucede, arrodíllese de inmediato, de luke forma que stephie nalgas estén más altas que li flo. [place] disminuirá la presión sobre el cordón umbilical hasta que llegue la [place].   Llame a li médico ahora mismo o busque atención médica inmediata si:  · Tiene señales de preeclampsia, tales nj:  ¨ Se le hinchan de manera repentina la clem, las sabas o los pies.  ¨ Problemas nuevos con la visión, nj oscurecimiento de la visión o visión borrosa. ¨ Dolor de flo intenso. · Tiene cualquier sangrado vaginal.  · Tiene dolor abdominal o cólicos. · Tiene fiebre. · Ha tenido contracciones regulares (con o sin dolor) por [place]ala Ruts. [place] significa que tiene 8 o más contracciones en 1 hora o que tiene 4 contracciones o más en 20 minutos después de [place] de posición y lambert líquidos. · Le sale líquido de la vagina de manera repentina. · Tiene dolor en la parte baja de la espalda o presión en la pelvis que no desaparece. · Nota que li bebé ha dejado de moverse o se mueve mucho menos de lo normal.  · Tiene flujo vaginal con un olor desagradable. · Tiene vómito intenso con dolor o fiebre, vomita 3 veces o más en el día, o vomita sandeep más de 1 hora cada día. Preste especial atención a los cambios en li chan y asegúrese de comunicarse con li médico si tiene algún problema. ¿Dónde puede encontrar más información en inglés? Anita Carr a http://emily-aislinn.info/. Escriba C333 en la búsqueda para aprender más acerca de \"Edad materna avanzada: Instrucciones de cuidado - [ Advanced Maternal Age: Care Instructions ]. \"  Revisado: 16 marzo, 2017  Versión del contenido: 11.3  © 3201-3335 Healthwise, Incorporated. Las instrucciones de cuidado fueron adaptadas bajo licencia por Good Help Connections (which disclaims liability or warranty for this information). Si usted tiene [place] afección médica o sobre estas instrucciones, siempre pregunte a li profesional de chan. Healthwise, Incorporated niega toda garantía o responsabilidad por li uso de esta información.

## 2017-10-03 NOTE — PROGRESS NOTES
Assessment/Plan:    Diagnoses and all orders for this visit:    1. Well woman exam    2. Missed period    3. Pelvic pain  -     Tiffany Oar / GC-AMPLIFIED; Future    4. Screening for cholesterol level  -     LIPID PANEL; Future    5. Encounter for screening  -     AMB POC URINE PREGNANCY TEST, VISUAL COLOR COMPARISON    6. AMA (advanced maternal age) multigravida 33+, first trimester  -     US PREG UTS < 14 WKS SNGL; Future  -     US UTS TRANSVAGINAL OB; Future  -     prenatal vit-calcium-iron-fa (PRENATAL PLUS) 29 mg iron- 1 mg tab tablet; Take 1 Tab by mouth daily. Please sub with $4 formulary  -     REFERRAL TO OBSTETRICS AND GYNECOLOGY        Follow-up Disposition:  Return in about 3 months (around 1/3/2018). Lyly Jj PA-C  [address] [place] [address] expressed understanding of this plan. An AVS was printed and given to the patient.      ----------------------------------------------------------------------    Chief Complaint   Patient presents with    Well Woman     Well woman exam    Other     Blood work requested by patient       History of Present Illness:  , both delivered via  (13and 11years old)  LMP about 2 months ago, was only spotting. About 1 month ago started to have very tender nipples and some pelvic discomfort. Her daughter told her that she looked pregnant. She took a pregnancy test 5 days ago and it was negative. She is not on birth control method. She is not having any unusual vaginal discharge. She is having discomfort now with intercourse, this is new in the past month  She is on HCTZ for hypertension  Last pap was in 2016, normal  Not on prenatal vitamins        No past medical history on file. Current Outpatient Prescriptions   Medication Sig Dispense Refill    prenatal vit-calcium-iron-fa (PRENATAL PLUS) 29 mg iron- 1 mg tab tablet Take 1 Tab by mouth daily.  Please sub with $4 formulary 30 Tab 5    hydroCHLOROthiazide (HYDRODIURIL) 25 mg tablet Take 1 Tab by mouth daily. 90 Tab 3       No Known Allergies    Social History   Substance Use Topics    Smoking status: Never Smoker    Smokeless tobacco: Never Used    Alcohol use No       No family history on file. Physical Exam:     Visit Vitals    /85 (BP 1 Location: Left arm, BP Patient Position: Sitting)    Pulse 69    Temp 98.7 °F (37.1 °C) (Oral)    Wt 192 lb (87.1 kg)    LMP  (Within Months)    BMI 35.33 kg/m2     Pleasant, looks well  A&Ox3  WDWN NAD  Respirations normal and non labored  Pelvic exam- ext neg for discharge or lesions. Cervix +? [place].  Uterus is found to be enlarged 9cm and then a pregnancy test was done to confirm that she has +UPT

## 2017-10-04 LAB
C TRACH DNA SPEC QL NAA+PROBE: NEGATIVE
N GONORRHOEA DNA SPEC QL NAA+PROBE: NEGATIVE
SAMPLE TYPE: NORMAL
SERVICE CMNT-IMP: NORMAL
SPECIMEN SOURCE: NORMAL

## 2017-10-04 NOTE — PROGRESS NOTES
Pt is pregnant.  She has achieved a lower chol with diet and lifestyle changes, no STATIN due to pregnancy

## 2017-10-10 ENCOUNTER — OFFICE VISIT (OUTPATIENT)
Dept: OBGYN CLINIC | Age: 42
End: 2017-10-10

## 2017-10-10 VITALS
WEIGHT: 190 LBS | DIASTOLIC BLOOD PRESSURE: 100 MMHG | BODY MASS INDEX: 34.96 KG/M2 | SYSTOLIC BLOOD PRESSURE: 142 MMHG | HEIGHT: 62 IN

## 2017-10-10 DIAGNOSIS — N91.2 AMENORRHEA: Primary | ICD-10-CM

## 2017-10-10 RX ORDER — LABETALOL 100 MG/1
100 TABLET, FILM COATED ORAL 2 TIMES DAILY
Qty: 60 TAB | Refills: 0 | Status: SHIPPED | OUTPATIENT
Start: 2017-10-10 | End: 2018-06-28 | Stop reason: DRUGHIGH

## 2017-10-10 NOTE — LETTER
10/10/2017 10:27 AM 
 
Ms. Sharda [address] [facility] [address] [id number] For medical reasons, above patient needs to return to our office on Thursday, October 12, 2018 for additional labs.  
 
 
 
Sincerely, 
 
 
 
 
Malcolm Meyer NP

## 2017-10-10 NOTE — PATIENT INSTRUCTIONS
Weeks 6 to 10 of Your Pregnancy: Care Instructions  Your Care Instructions    Congratulations on your pregnancy. This is an exciting and important time for you. During the first 6 to 10 weeks of your pregnancy, your body goes through many changes. Your baby grows very fast, even though you cannot feel it yet. You may start to notice that you feel different, both in your body and your emotions. Because each woman's pregnancy is unique, there is no right way to feel. You may feel the healthiest you have ever been, or you may feel tired or sick to your stomach (\"morning sickness\"). These early weeks are a time to make healthy choices and to eat the best foods for you and your baby. This care sheet will give you some ideas. This is also a good time to think about birth defects testing. These are tests done during pregnancy to look for possible problems with the baby. First trimester tests for birth defects can be done between 8 and 17 weeks of pregnancy, depending on the test. Talk with your doctor about what kinds of tests are available. Follow-up care is a key part of your treatment and safety. Be sure to make and go to all appointments, and call your doctor if you are having problems. It's also a good idea to know your test results and keep a list of the medicines you take. How can you care for yourself at home? Eat well  · Eat at least 3 meals and 2 healthy snacks every day. Eat fresh, whole foods, including:  ¨ 7 or more servings of bread, tortillas, cereal, rice, pasta, or oatmeal.  ¨ 3 or more servings of vegetables, especially leafy green vegetables. ¨ 2 or more servings of fruits. ¨ 3 or more servings of milk, yogurt, or cheese. ¨ 2 or more servings of meat, turkey, chicken, fish, eggs, or dried beans. · Drink plenty of fluids, especially water. Avoid sodas and other sweetened drinks. · Choose foods that have important vitamins for your baby, such as calcium, iron, and folate.   ¨ Dairy products, tofu, canned fish with bones, almonds, broccoli, dark leafy greens, corn tortillas, and fortified orange juice are good sources of calcium. ¨ Beef, poultry, liver, spinach, lentils, dried beans, fortified cereals, and dried fruits are rich in iron. ¨ Dark leafy greens, broccoli, asparagus, liver, fortified cereals, orange juice, peanuts, and almonds are good sources of folate. · Avoid foods that could harm your baby. ¨ Do not eat raw or undercooked meat, chicken, or fish (such as sushi or raw oysters). ¨ Do not eat raw eggs or foods that contain raw eggs, such as Caesar dressing. ¨ Do not eat soft cheeses and unpasteurized dairy foods, such as Brie, feta, or blue cheese. ¨ Do not eat fish that contains a lot of mercury, such as shark, swordfish, tilefish, or ronald mackerel. Do not eat more than 6 ounces of tuna each week. ¨ Do not eat raw sprouts, especially alfalfa sprouts. ¨ Cut down on caffeine, such as coffee, tea, and cola. Protect yourself and your baby  · Do not touch niki litter or cat feces. They can cause an infection that could harm your baby. · High body temperature can be harmful to your baby. So if you want to use a sauna or hot tub, be sure to talk to your doctor about how to use it safely. [place] with morning sickness  · Sip small amounts of water, juices, or shakes. Try drinking between meals, not with meals. · Eat 5 or 6 small meals a day. Try dry toast or crackers when you first get up, and eat breakfast a little later. · Avoid spicy, greasy, and fatty foods. · When you feel sick, open your windows or go for a short walk to get fresh air. · Try nausea wristbands. These help some women. · Tell your doctor if you think your prenatal vitamins make you sick. Where can you learn more? Go to http://huang.info/. Enter G112 in the search box to learn more about \"Weeks 6 to 10 of Your Pregnancy: Care Instructions. \"  Current as of: March 16, 2017  Content Version: 11.3  © 4124-5388 [employer]. Care instructions adapted under license by [employer] (which disclaims liability or warranty for this information). If you have questions about a medical condition or this instruction, always ask your healthcare professional. [address] any warranty or liability for your use of this information.

## 2017-10-10 NOTE — PROGRESS NOTES
Chief complaint:  Irregular cycles  Last cycle; Patient's last menstrual period was 2017 (approximate). This is a new concern and an evaluation is planned. Current pregnancy history:  Asher Feliz is a [id number], 46 y.o. female OTHER  Previous C/S x 2  She presents for the evaluation of irregular menses and a positive pregnancy test.    LMP history:  Patient's last menstrual period was 2017 (approximate). .  The date of the beginning of her last menstrual period is not certain. Her menses are not regular. Her cycles occur about every several weeks. A urine pregnancy test was positive about 1 weeks ago. She was not using contraception at the estimated time of conception. Based on her LMP, her EGA is 7 weeks,0 days with and EDC of 18. Ultrasound data:  She had an ultrasound today which revealed a viable ornelas pregnancy with a gestational age unable to determine. Pt has Axial Uterine position unable to determine Fetal viability and dating today  Pregnancy symptoms:  She reports fatigue  breast tenderness  nausea  \"morning sickness\"  positive home pregnancy test  frequent urination. She denies positive home pregnancy test.  Since she found out she is pregnant, she has there was no change in patient's weight. She reports her prepregnancy weight as 0 pounds. Relevant past pregnancy history:  She has the following pregnancy history:none. She does have a history of  delivery. She does have a history of a prior  section. Relevant past medical history:(relevant to this pregnancy):   none     Pap smear history:  Last pap smear: 10/2017  Results: within normal limits    Occupational history  Her occupation is: part time job doing cleaning. Substance history:   She does not report current tobacco use. She does not report current alcohol use. She does not report current drug use. Exposure history:    There are not indoor cat(s) in the home. The patient was instructed not to change cat litter boxes during pregnancy. She does report close contact with children on a regular basis. She has chicken pox or the vaccine in the past.   Patient does not report issues with domestic violence. Genetic Screening/Teratology Counseling:   (Includes patient, baby's father, or anyone in either family with:)  1.  Patient's age >/= 28 [facility]?--         No past medical history on file. No past surgical history on file. Social History     Occupational History    Not on file. Social History Main Topics    Smoking status: Never Smoker    Smokeless tobacco: Never Used    Alcohol use No    Drug use: No    Sexual activity: Not on file     No family history on file. No Known Allergies  Prior to Admission medications    Medication Sig Start Date End Date Taking? Authorizing Provider   hydroCHLOROthiazide (HYDRODIURIL) 25 mg tablet Take 1 Tab by mouth daily. 9/28/17  Yes Jude Weston MD   prenatal vit-calcium-iron-fa (PRENATAL PLUS) 29 mg iron- 1 mg tab tablet Take 1 Tab by mouth daily.  Please sub with $4 formulary 10/3/17   ROSSY Raymond        Review of Systems - History obtained from the patient  Constitutional: negative for weight loss, fever, night sweats  HEENT: negative for hearing loss, earache, congestion, snoring, sorethroat  CV: negative for chest pain, palpitations, edema  Resp: negative for cough, shortness of breath, wheezing  GI: negative for change in bowel habits, abdominal pain, black or bloody stools  : negative for frequency, dysuria, hematuria, vaginal discharge  MSK: negative for back pain, joint pain, muscle pain  Breast: negative for breast lumps, nipple discharge, galactorrhea  Skin :negative for itching, rash, hives  Neuro: negative for dizziness, headache, confusion, weakness  Psych: negative for anxiety, depression, change in mood  Heme/lymph: negative for bleeding, bruising, pallor    Objective:  Visit Vitals  BP (!) 142/100 (BP 1 Location: Left arm, BP Patient Position: Sitting)    Ht 5' 2\" (1.575 m)    Wt 190 lb (86.2 kg)    LMP 08/16/2017 (Approximate)    BMI 34.75 kg/m2       Physical Exam:   Constitutional  · Appearance: well-nourished, well developed, alert, in no acute distress    HENT  · Head  · Face: appears normal  · Eyes: appear normal  · Ears: normal  · Mouth: normal  · Lips: no lesions    Skin  · General Inspection: no rash, no lesions identified    Neurologic/Psychiatric  · Mental Status:  · Orientation: grossly oriented to person, place and time  · Mood and Affect: mood normal, affect appropriate    Assessment:   Irregular cycles  Early OB vs. Threatened AB  Due date: Unknown    Plan:   Beta HCG Quant today, Repeat Thursday am  Appt for repeat scan with full bladder  D/C HCTZ and switch to Labetalol 100 mg po BID  We discussed signs and symptoms of abnormal pregnancies and miscarriage. Handouts given to pt. Physician review of ultrasound performed by technician  Today's ultrasound report and images were reviewed and discussed with the patient. Please see images and imaging report entered by technician in PACS for more detail and progress note and diagnosis entered by MD.       used Wren Baker [id number]    Audra Lennox.  ESPERANZA Chan/ZARA

## 2017-10-11 LAB — HCG INTACT+B SERPL-ACNC: 1426 MIU/ML

## 2017-10-12 ENCOUNTER — LAB ONLY (OUTPATIENT)
Dept: OBGYN CLINIC | Age: 42
End: 2017-10-12

## 2017-10-12 DIAGNOSIS — N92.6 IRREGULAR MENSES: Primary | ICD-10-CM

## 2017-10-13 LAB — HCG INTACT+B SERPL-ACNC: 2600 MIU/ML

## 2017-10-25 ENCOUNTER — OFFICE VISIT (OUTPATIENT)
Dept: OBGYN CLINIC | Age: 42
End: 2017-10-25

## 2017-10-25 VITALS
BODY MASS INDEX: 34.78 KG/M2 | WEIGHT: 189 LBS | SYSTOLIC BLOOD PRESSURE: 126 MMHG | HEIGHT: 62 IN | DIASTOLIC BLOOD PRESSURE: 80 MMHG

## 2017-10-25 DIAGNOSIS — O20.0 THREATENED ABORTION: Primary | ICD-10-CM

## 2017-10-25 NOTE — PROGRESS NOTES
Pelvic Ultrasound 10/25/17: both transvaginal and transabdominal images taken    Uterus: intrauterine gestational sac eccentrically located in left upper fundus near left cornua, but appears to be within endometrial cavity  Gestational sac: measuring 1.87cm (c/w 6w2d). A normal yolk sac is visualized, but no fetal pole is seen.    ROV: normal appearing, measuring 3.11 x 2.65cm  Right adnexa: no appreciable masses  LOV: normal appearing, measuring 2.29 x 1.67 x 1.29cm  Left adnexa: no appreciable adnexal masses  CDS: no free fluid    Bria Caballero MD  10/25/2017  9:56 AM

## 2017-10-25 NOTE — PROGRESS NOTES
Threatened AB note    Shana Wellington is a ,  46 y.o. female OTHER Patient's last menstrual period was 2017 (approximate). making her 10 weeks pregnant. She has had prenatal care, last seen 10/10/17. She has had a recent pelvic ultrasound on 10/10/17 which showed a gestational sac; too early to determine gestational age. The patient had an ultrasound today that showed: No fetal pole is seen. A normal yolk sac is seen. Measuring 6w2d. She denies any cramping or bleeding. But notes decrease in breast tenderness over past few days. The patient does report a history of irregular cycles. Previous C/S x 2. Has missed last 2 days of Labetalol pt states forgets  No past medical history on file. No past surgical history on file. Social History     Occupational History    Not on file. Social History Main Topics    Smoking status: Never Smoker    Smokeless tobacco: Never Used    Alcohol use No    Drug use: No    Sexual activity: Not on file     No family history on file. No Known Allergies  Prior to Admission medications    Medication Sig Start Date End Date Taking? Authorizing Provider   prenatal vit-calcium-iron-fa (PRENATAL PLUS) 29 mg iron- 1 mg tab tablet Take 1 Tab by mouth daily. Please sub with $4 formulary 10/3/17  Yes ROSSY Madera   labetalol (NORMODYNE) 100 mg tablet Take 1 Tab by mouth two (2) times a day.  10/10/17   Mary Reeves NP        Review of Systems: History obtained from the patient  Constitutional: negative for weight loss, fever, night sweats  Breast: negative for breast lumps, nipple discharge, galactorrhea, no tenderness as previously reported  GI: negative for change in bowel habits, abdominal pain, black or bloody stools  : negative for frequency, dysuria, hematuria, vaginal discharge  MSK: negative for back pain, joint pain, muscle pain  Skin: negative for itching, rash, hives  Psych: negative for anxiety, depression, change in mood      Objective:  Visit Vitals    /80    Ht 5' 2\" (1.575 m)    Wt 189 lb (85.7 kg)    LMP 08/16/2017 (Approximate)    BMI 34.57 kg/m2       Physical Exam:   PHYSICAL EXAMINATION    Constitutional  · Appearance: well-nourished, well developed, alert, in no acute distress    Skin  · General Inspection: no rash, no lesions identified    Neurologic/Psychiatric  · Mental Status:  · Orientation: grossly oriented to person, place and time  · Mood and Affect: mood normal, affect appropriate    Assessment:   Threatened AB vs Early OB    Plan:   US  Quantitative HCG's today, repeat Friday  SAB/Bleeding precautions reviewed  Reiterated need to take Labetalol every day for BP control  RTO: Will call pt with Quants and plan for repeat sono in 2 wks or appt for management as Quants dictate  Visit consult translated with  phone/ # [phone] [facility].  ESPERANZA Hickey/ZARA

## 2017-10-26 LAB
ABO GROUP BLD: NORMAL
HCG INTACT+B SERPL-ACNC: NORMAL MIU/ML
PROGEST SERPL-MCNC: 19.6 NG/ML
RH BLD: POSITIVE

## 2017-10-27 ENCOUNTER — LAB ONLY (OUTPATIENT)
Dept: OBGYN CLINIC | Age: 42
End: 2017-10-27

## 2017-10-27 DIAGNOSIS — O20.0 THREATENED ABORTION: Primary | ICD-10-CM

## 2018-06-28 ENCOUNTER — OFFICE VISIT (OUTPATIENT)
Dept: FAMILY MEDICINE CLINIC | Age: 43
End: 2018-06-28

## 2018-06-28 VITALS
TEMPERATURE: 97.6 F | HEART RATE: 69 BPM | HEIGHT: 62 IN | DIASTOLIC BLOOD PRESSURE: 108 MMHG | WEIGHT: 189 LBS | BODY MASS INDEX: 34.78 KG/M2 | SYSTOLIC BLOOD PRESSURE: 174 MMHG

## 2018-06-28 DIAGNOSIS — I10 ESSENTIAL HYPERTENSION: ICD-10-CM

## 2018-06-28 DIAGNOSIS — Z13.0 SCREENING, IRON DEFICIENCY ANEMIA: Primary | ICD-10-CM

## 2018-06-28 LAB — HGB BLD-MCNC: 10.4 G/DL

## 2018-06-28 RX ORDER — LABETALOL 200 MG/1
TABLET, FILM COATED ORAL
Qty: 180 TAB | Refills: 5 | Status: SHIPPED | OUTPATIENT
Start: 2018-06-28 | End: 2018-12-03 | Stop reason: SDUPTHER

## 2018-06-28 RX ORDER — ENOXAPARIN SODIUM 100 MG/ML
INJECTION SUBCUTANEOUS
Refills: 0 | COMMUNITY
Start: 2018-05-28 | End: 2018-12-03

## 2018-06-28 NOTE — PROGRESS NOTES
Chief Complaint   Patient presents with    Hypertension     medication refill       Pt states she take an injection for her blood coagulation , f/u appt in 4 wks at [facility]  1. Have you been to the ER, urgent care clinic since your last visit? Hospitalized since your last visit? Yes @ [facility], prenatal care. x 1 month ago    2. Have you seen or consulted any other health care providers outside of the [address] [place] [address] since your last visit? Include any pap smears or colon screening. No    Does the patient need refills? YES    Learning Assessment Complete?  yes

## 2018-06-28 NOTE — PATIENT INSTRUCTIONS
Presión arterial surya: Instrucciones de cuidado - [ High Blood Pressure: Care Instructions ]  Instrucciones de cuidado    Si li presión arterial suele ser superior a 140/90, usted tiene presión arterial surya o hipertensión. [place] significa que el número de [place] es 140 o superior o que el número de abajo es 90 o superior, o ambas cosas. A pesar de lo que mucha gente ramya, la hipertensión no suele causar dolor de flo ni provocar mareos o aturdimiento. Generalmente, no presenta síntomas. Sin embargo, aumenta el riesgo de ataque al corazón, ataque cerebral, y daños en los riñones o en los ojos. A mayor presión arterial, mayor riesgo. Li médico le dará adriana meta para li presión arterial. Li meta se basará en li chan y li edad. Un ejemplo de meta es mantener li presión arterial por debajo de 140/90. Los cambios de estilo de jocelyn, nj alimentarse en forma saludable y [place], siempre son importantes para ayudarle a bajar la presión arterial. También podría lambert medicamentos para lograr li meta para la presión arterial.  La atención de seguimiento es adriana parte clave de li tratamiento y seguridad. Asegúrese de hacer y acudir a todas las citas, y llame a li médico si está teniendo problemas. También es adriana buena idea saber los resultados de los exámenes y mantener adriana lista de los medicamentos que kristi. ¿Cómo puede cuidarse en el hogar? Tratamiento médico  · Si sonido de lambert stephie medicamentos, li presión arterial volverá a subir. Es posible que deba lambert un tipo de [place], o más de [place], para reducir la presión arterial. Sea ant con los medicamentos. [place] li medicamento exactamente nj se lo hayan indicado. Llame a li médico si ramya estar teniendo problemas con li medicamento. · Hable con li médico antes de empezar a lambert [facility].  La aspirina puede ayudar a determinadas personas a reducir li riesgo de tener un ataque cardíaco o un ataque cerebral. Helen lambert aspirina no es adecuado para todo [address], debido a que puede causar sangrado grave. · Visite a li médico periódicamente. Usted podría necesitar consultar a li médico con más frecuencia al principio o hasta que se reduzca li presión arterial.  · Si usted está tomando medicamentos para la presión arterial, hable con li médico antes de lambert medicamentos descongestionantes o antiinflamatorios, nj ibuprofeno. Algunos de estos medicamentos pueden elevar la presión arterial.  · Aprenda a revisarse la presión arterial en li hogar. Cambios en el estilo de jocelyn  · Mantenga un peso saludable. [place] es particularmente importante si aumenta de peso en la andreina de la cintura. Bajar solo 10 libras (4.5 kg) puede ayudarle a reducir li presión arterial.  · Michelle más ejercicios si li médico se lo recomienda. Caminar es adriana buena opción. Poco a poco, aumente la distancia que [employer]. Intente hacer por lo menos 30 minutos de ejercicio la mayoría de los días de la [place]. También podría nadar, montar en bicicleta o hacer otras actividades. · No tome alcohol o limite la cantidad que seema. Hable con li médico acerca de si puede lambert alcohol. · Trate de limitar la cantidad de sodio que consume a menos de 2,300 miligramos (mg) al día. Li médico podría pedirle que trate de consumir menos de 1,500 mg al día. · Coma abundantes frutas (nj bananas [plátanos] y naranjas), verduras, legumbres, granos integrales y productos lácteos de bajo contenido de [place]. · Reduzca la cantidad de grasas saturadas en li dieta. Los productos derivados de los [place], nj la [place], el queso y la carne, contienen grasas saturadas. El limitar estos alimentos podría ayudarle a bajar de peso y [place] reducir li riesgo de adriana enfermedad cardíaca. · No fume. El hábito de fumar aumenta li riesgo de ataque cerebral y ataque al corazón. Si necesita ayuda para dejar de fumar, hable con li médico sobre programas y medicamentos para dejar de fumar.  Estos pueden aumentar stephie probabilidades de dejar el hábito para siempre. ¿Cuándo debe pedir ayuda? Llame al 911 en cualquier momento que considere que necesita atención de [place]. [place] puede significar que tiene síntomas que sugieren que li presión arterial le está causando un problema cardíaco o vascular grave. Li presión arterial podría ser superior a 180/110. ? Por ejemplo, llame al 911 si:  ? · Tiene síntomas de un ataque al corazón. Estos pueden incluir:  ¨ Dolor o presión en el pecho, o adriana sensación extraña en el pecho. ¨ Sudoración. ¨ Falta de aire. ¨ Náuseas o vómito. ¨ Dolor, presión o adriana sensación extraña en la espalda, el tu, la mandíbula, la parte superior del abdomen o en ava o ambos hombros o brazos. ¨ Aturdimiento o debilidad repentina. ¨ Latidos del corazón rápidos o irregulares. ? · Tiene síntomas de un ataque cerebral. Estos pueden incluir:  ¨ Entumecimiento, hormigueo, debilidad o parálisis repentinos en la clem, el brazo o la pierna, sobre todo en un solo lado del cuerpo. ¨ Cambios repentinos en la visión. ¨ Dificultades repentinas para hablar. ¨ Confusión repentina o dificultad súbita para comprender frases sencillas. ¨ Problemas repentinos para caminar o mantener el equilibrio. ¨ Dolor de [place] intenso y repentino, distinto de los sravanthi de flo anteriores. ? · Tiene un dolor intenso en la espalda o el abdomen. ? No espere a que la presión arterial baje por sí serg. Obtenga ayuda de inmediato. ? Llame a li médico ahora mismo o busque atención de inmediato si:  ? · Tiene la presión arterial mucho más surya de lo normal (nj 180/110 o más surya), ashish no tiene síntomas. ? · Piensa que la presión arterial surya le está provocando síntomas, nj:  ¨ Dolor de flo intenso. [address] ? Vigile de cerca los La[place], y asegúrese de comunicarse con li médico si:  ? · Li presión arterial mide 140/90 o más en al menos 2 ocasiones.  [place] significa que el número de arriba es 140 o superior o el número de abajo es 90 o superior, o ambas cosas. ? · Ely que podría estar teniendo efectos secundarios de los medicamentos para la presión arterial.   ? · Li presión arterial suele ser normal, ashish se eleva por encima de lo normal en al menos 2 ocasiones. ¿Dónde puede encontrar más información en inglés? Johan Dorado a http://emily-aislinn.info/. Shelton Booze I046 en la búsqueda para aprender más acerca de \"Presión arterial surya: Instrucciones de cuidado - [ High Blood Pressure: Care Instructions ]. \"  Revisado: 21 septiembre, 2016  Versión del contenido: 11.4  © 8776-5721 [employer]wise, Incorporated. Las instrucciones de cuidado fueron adaptadas bajo licencia por [employer] (which disclaims liability or warranty for this information). Si usted tiene [place] afección médica o sobre estas instrucciones, siempre pregunte a li profesional de chan. Healthwise, Incorporated niega toda garantía o responsabilidad por li uso de esta información.

## 2018-06-28 NOTE — PROGRESS NOTES
The following was performed at discharge station per provider with the assistance of , Edward Meneses:   AVS printed, reviewed with pt and given to pt. Reviewed information regarding HTN with pt. Reviewed new medication, Labetalol, with pt and printed Good RX coupon, $44.21, for [facility]. Pt signed Release of records for [facility] and this will be faxed to that facility. Pt was told by registrar to call in August for an appt in September (approved by ROSSY Thompson).

## 2018-06-28 NOTE — PROGRESS NOTES
Assessment/Plan:    Diagnoses and all orders for this visit:    1. Screening, iron deficiency anemia  -     AMB POC HEMOGLOBIN (HGB)    2. Essential hypertension  -     labetalol (NORMODYNE) 200 mg tablet; Sig: 3 po bid    3. Lactating mother    The cost of 1 month of labetalol is $45 with good rx coupon. She thinks that she can afford this. She has been off of her medication for about a week bc of the cost, she didn't have the good rx coupon and [facility] did not offer this info for her. ..shame on them. Get records from [facility], ? Why is she on lovenox - she has the injections at home and has f/up in 2 weeks for this problem. She has some sort of hypercoagulable state        Follow-up Disposition:  Return in about 1 month (around 2018). Olivia Jj PA-C  [address] [place] [address] Drive expressed understanding of this plan. An AVS was printed and given to the patient.      ----------------------------------------------------------------------    Chief Complaint   Patient presents with    Hypertension     medication refill, out of meds for 2 wks       History of Present Illness:  Pt here for help with her bp medication. She is exactly 1 month pp [facility]  at 37 weeks due to hypertension. On lovenox for 42 days post delivery due to \"clots\". Had BTL during the surgery. Is breast feeding and supplementing with formula. She has hx of HTN. She was most recently on labetalol 200 mg 3 po bid. She went to get it filled and they asked for $150 dollars and she could not afford this. She is not having chest pain or SOB or NEUMANN. It is 1 pm and she has not eaten today and she is feeling bad due to this. Her baby is not here and she is becoming engorged. Plan for close f/up        History reviewed. No pertinent past medical history.     Current Outpatient Prescriptions   Medication Sig Dispense Refill    labetalol (NORMODYNE) 200 mg tablet Sig: 3 po bid 180 Tab 5    enoxaparin (LOVENOX) 40 mg/0.4 mL INJECT 1 SYRINGE ( 40 MG ) UNDER THE SKIN ONCE DAILY FOR 42 DAYS  0    prenatal vit-calcium-iron-fa (PRENATAL PLUS) 29 mg iron- 1 mg tab tablet Take 1 Tab by mouth daily. Please sub with $4 formulary 30 Tab 5       No Known Allergies    Social History   Substance Use Topics    Smoking status: Never Smoker    Smokeless tobacco: Never Used    Alcohol use No       History reviewed. No pertinent family history.     Physical Exam:     Visit Vitals    BP (!) 174/108 (BP 1 Location: Left arm, BP Patient Position: Sitting)    Pulse 69    Temp 97.6 °F (36.4 °C) (Oral)    Ht 5' 1.81\" (1.57 m)    Wt 189 lb (85.7 kg)    BMI 34.78 kg/m2     gen pleasant and smiling  A&Ox3  WDWN NAD  Respirations normal and non labored

## 2018-08-15 ENCOUNTER — DOCUMENTATION ONLY (OUTPATIENT)
Dept: FAMILY MEDICINE CLINIC | Age: 43
End: 2018-08-15

## 2018-08-15 NOTE — PROGRESS NOTES
Records received from [facility]. Pt delivered via  due to severe pre-eclampsia. Immediate post partum period was complicated by anemia and ? hypercoaguable state so she was started on 6 weeks of lovenox and is being followed by OB and heme onc. To date, there has not been any specific coag problem identified.

## 2018-12-03 ENCOUNTER — HOSPITAL ENCOUNTER (OUTPATIENT)
Dept: LAB | Age: 43
Discharge: HOME | End: 2018-12-03

## 2018-12-03 ENCOUNTER — OFFICE VISIT (OUTPATIENT)
Dept: FAMILY MEDICINE CLINIC | Age: 43
End: 2018-12-03

## 2018-12-03 VITALS
TEMPERATURE: 98.6 F | BODY MASS INDEX: 38.64 KG/M2 | SYSTOLIC BLOOD PRESSURE: 180 MMHG | HEART RATE: 91 BPM | WEIGHT: 210 LBS | DIASTOLIC BLOOD PRESSURE: 107 MMHG

## 2018-12-03 DIAGNOSIS — D50.9 IRON DEFICIENCY ANEMIA, UNSPECIFIED IRON DEFICIENCY ANEMIA TYPE: ICD-10-CM

## 2018-12-03 DIAGNOSIS — I10 ESSENTIAL HYPERTENSION: ICD-10-CM

## 2018-12-03 DIAGNOSIS — G45.3 AMAUROSIS FUGAX OF LEFT EYE: Primary | ICD-10-CM

## 2018-12-03 PROBLEM — E66.01 SEVERE OBESITY (HCC): Status: ACTIVE | Noted: 2018-12-03

## 2018-12-03 LAB
ALBUMIN SERPL-MCNC: 3.8 G/DL (ref 3.5–5)
ALBUMIN/GLOB SERPL: 0.9 {RATIO} (ref 1.1–2.2)
ALP SERPL-CCNC: 112 U/L (ref 45–117)
ALT SERPL-CCNC: 26 U/L (ref 12–78)
ANION GAP SERPL CALC-SCNC: 7 MMOL/L (ref 5–15)
AST SERPL-CCNC: 21 U/L (ref 15–37)
BASOPHILS # BLD: 0.1 K/UL (ref 0–0.1)
BASOPHILS NFR BLD: 1 % (ref 0–1)
BILIRUB SERPL-MCNC: 0.3 MG/DL (ref 0.2–1)
BUN SERPL-MCNC: 17 MG/DL (ref 6–20)
BUN/CREAT SERPL: 19 (ref 12–20)
CALCIUM SERPL-MCNC: 8.5 MG/DL (ref 8.5–10.1)
CHLORIDE SERPL-SCNC: 110 MMOL/L (ref 97–108)
CO2 SERPL-SCNC: 24 MMOL/L (ref 21–32)
CREAT SERPL-MCNC: 0.88 MG/DL (ref 0.55–1.02)
DIFFERENTIAL METHOD BLD: ABNORMAL
EOSINOPHIL # BLD: 0.2 K/UL (ref 0–0.4)
EOSINOPHIL NFR BLD: 3 % (ref 0–7)
ERYTHROCYTE [DISTWIDTH] IN BLOOD BY AUTOMATED COUNT: 14.7 % (ref 11.5–14.5)
GLOBULIN SER CALC-MCNC: 4.1 G/DL (ref 2–4)
GLUCOSE SERPL-MCNC: 113 MG/DL (ref 65–100)
HCT VFR BLD AUTO: 43.5 % (ref 35–47)
HGB BLD-MCNC: 13.8 G/DL (ref 11.5–16)
IMM GRANULOCYTES # BLD: 0 K/UL (ref 0–0.04)
IMM GRANULOCYTES NFR BLD AUTO: 0 % (ref 0–0.5)
LYMPHOCYTES # BLD: 3.1 K/UL (ref 0.8–3.5)
LYMPHOCYTES NFR BLD: 46 % (ref 12–49)
MCH RBC QN AUTO: 26 PG (ref 26–34)
MCHC RBC AUTO-ENTMCNC: 31.7 G/DL (ref 30–36.5)
MCV RBC AUTO: 81.9 FL (ref 80–99)
MONOCYTES # BLD: 0.6 K/UL (ref 0–1)
MONOCYTES NFR BLD: 9 % (ref 5–13)
NEUTS SEG # BLD: 2.8 K/UL (ref 1.8–8)
NEUTS SEG NFR BLD: 42 % (ref 32–75)
NRBC # BLD: 0 K/UL (ref 0–0.01)
NRBC BLD-RTO: 0 PER 100 WBC
PLATELET # BLD AUTO: 331 K/UL (ref 150–400)
PMV BLD AUTO: 10.7 FL (ref 8.9–12.9)
POTASSIUM SERPL-SCNC: 4.4 MMOL/L (ref 3.5–5.1)
PROT SERPL-MCNC: 7.9 G/DL (ref 6.4–8.2)
RBC # BLD AUTO: 5.31 M/UL (ref 3.8–5.2)
SODIUM SERPL-SCNC: 141 MMOL/L (ref 136–145)
WBC # BLD AUTO: 6.8 K/UL (ref 3.6–11)

## 2018-12-03 PROCEDURE — 80053 COMPREHEN METABOLIC PANEL: CPT

## 2018-12-03 PROCEDURE — 36415 COLL VENOUS BLD VENIPUNCTURE: CPT

## 2018-12-03 PROCEDURE — 85025 COMPLETE CBC W/AUTO DIFF WBC: CPT

## 2018-12-03 RX ORDER — LABETALOL 200 MG/1
TABLET, FILM COATED ORAL
Qty: 180 TAB | Refills: 5 | Status: SHIPPED | OUTPATIENT
Start: 2018-12-03 | End: 2019-01-24

## 2018-12-03 RX ORDER — LANOLIN ALCOHOL/MO/W.PET/CERES
325 CREAM (GRAM) TOPICAL 2 TIMES DAILY
Qty: 180 TAB | Refills: 0 | Status: SHIPPED | OUTPATIENT
Start: 2018-12-03 | End: 2018-12-27

## 2018-12-03 NOTE — PROGRESS NOTES
Coordination of Care  1. Have you been to the ER, urgent care clinic since your last visit? Hospitalized since your last visit? No    2. Have you seen or consulted any other health care providers outside of the [address] [place] [address] since your last visit? Include any pap smears or colon screening. No    Does the patient need refills? N/A    Learning Assessment Complete?  yes

## 2018-12-03 NOTE — PROGRESS NOTES
Reviewed AVS, prescription and pharmacy location with patient. AVS and goodrx coupon (Labetalol)printed and given. CT of the head was scheduled for patient; date, time and place of test was approved/agreed by patient. (nurse asked  if okay to do contrast even if patient was breastfeeding,  called CT department and per CT dept. It was okay to get contrast and breastfeed. Patient aware to arrive 30 minutes prior to outpatient registration. Take a picture ID, No solid food for 4 hours prior the test, can have clear liquids, No NSAIDS. Care card/financial assistance process explained to patient. Med assist phone number given. Ophthalmology Access Now referral was placed for patient, AN program explained to patient, instructed patient to make an appt. With CVAN OW to complete AN referral.  Patient aware that provider would like to follow up about today's visit in 3 weeks . This has been fully explained to the patient, who indicates understanding and agrees with plan. No further questions at this time.  Ty Ramos RN

## 2018-12-03 NOTE — PROGRESS NOTES
Assessment/Plan:    Diagnoses and all orders for this visit:    1. Amaurosis fugax of left eye  -     CT HEAD W WO CONT; Future  -     REFERRAL TO OPHTHALMOLOGY    2. Essential hypertension  -     METABOLIC PANEL, COMPREHENSIVE; Future  -     labetalol (NORMODYNE) 200 mg tablet; Sig: 3 po bid    3. Iron deficiency anemia, unspecified iron deficiency anemia type  -     ferrous sulfate (IRON, FERROUS SULFATE,) 325 mg (65 mg iron) tablet; Take 1 Tab by mouth two (2) times a day. -     CBC WITH AUTOMATED DIFF; Future    pt describes sxs of \"something dropping down in my left eye vision\". This is not a new sx and actually started during her pregnancy. The frequency is daily and the number of times a day can be 1-3. Her vision spontaneously returns. Her eye exam is neg. Her blood pressure is very high still, she was due to return to the clinic in July 2018 and now it is Dec 2018 and I am seeing her for the first time, off her meds as she ran out. Close f/up   Flu shot  Labs today    Referral to eye and for head ct    Follow-up Disposition:  Return in about 3 weeks (around 12/24/2018). Barbara Jj PA-C  [address] [place] [address] Drive expressed understanding of this plan. An AVS was printed and given to the patient.      ----------------------------------------------------------------------    Chief Complaint   Patient presents with    Hypertension       History of Present Illness:  She is off lovenox since 7/18. Her only med that she is on other then the labetalol is iron 325 mg bid. She was last seen at [facility] in 9/18 and it sounds like she was discharged at that time. She is breastfeeding her 11 month old. She ran out of labetalol yesterday. She only had one dose yesterday. She denies chest pain. She reports a problem with her left eye that started during the pregnancy. She has acute vision loss when something drops \"down from above and covers her eye\".  This has been happening for about a year, daily and up to 3 times a day. The vision is restored spontaneously and with clear vision. She sometimes has a pain in her posterior inferior scalp/base of her neck. This is not present currently    She states that for the most part, she took her labetalol daily as prescribed. She had \"a little high but not too high\" bp readings since I last saw her. Her bp medication was not changed at her appt at [facility] in 9/18. No past medical history on file. Current Outpatient Medications   Medication Sig Dispense Refill    ferrous sulfate (IRON, FERROUS SULFATE,) 325 mg (65 mg iron) tablet Take 1 Tab by mouth two (2) times a day. 180 Tab 0    labetalol (NORMODYNE) 200 mg tablet Sig: 3 po bid 180 Tab 5    prenatal vit-calcium-iron-fa (PRENATAL PLUS) 29 mg iron- 1 mg tab tablet Take 1 Tab by mouth daily. Please sub with $4 formulary 30 Tab 5       No Known Allergies    Social History     Tobacco Use    Smoking status: Never Smoker    Smokeless tobacco: Never Used   Substance Use Topics    Alcohol use: No     Alcohol/week: 0.0 oz    Drug use: No       No family history on file.     Physical Exam:     Visit Vitals  BP (!) 180/107 (BP 1 Location: Right arm, BP Patient Position: Sitting)   Pulse 91   Temp 98.6 °F (37 °C) (Oral)   Wt 210 lb (95.3 kg)   BMI 38.64 kg/m²       A&Ox3  WDWN NAD  Respirations normal and non labored  Neuro: CN 2-12 grossly intact on exam. PERRLA, EOMI, no nystagmus on exam  Lab Results   Component Value Date/Time    Sodium 140 02/16/2017 10:27 AM    Potassium 4.4 02/16/2017 10:27 AM    Chloride 104 02/16/2017 10:27 AM    CO2 27 02/16/2017 10:27 AM    Anion gap 9 02/16/2017 10:27 AM    Glucose 77 02/16/2017 10:27 AM    BUN 13 02/16/2017 10:27 AM    Creatinine 0.74 02/16/2017 10:27 AM    BUN/Creatinine ratio 18 02/16/2017 10:27 AM    GFR est AA >60 02/16/2017 10:27 AM    GFR est non-AA >60 02/16/2017 10:27 AM    Calcium 8.7 02/16/2017 10:27 AM    Bilirubin, total 0.2 02/16/2017 10:27 AM    AST (SGOT) 33 02/16/2017 10:27 AM    Alk.  phosphatase 101 02/16/2017 10:27 AM    Protein, total 7.7 02/16/2017 10:27 AM    Albumin 4.0 02/16/2017 10:27 AM    Globulin 3.7 02/16/2017 10:27 AM    A-G Ratio 1.1 02/16/2017 10:27 AM    ALT (SGPT) 50 02/16/2017 10:27 AM

## 2018-12-08 ENCOUNTER — HOSPITAL ENCOUNTER (OUTPATIENT)
Dept: CT IMAGING | Age: 43
Discharge: HOME | End: 2018-12-08
Payer: SELF-PAY

## 2018-12-08 DIAGNOSIS — G45.3 AMAUROSIS FUGAX OF LEFT EYE: ICD-10-CM

## 2018-12-08 PROCEDURE — 70481 CT ORBIT/EAR/FOSSA W/DYE: CPT

## 2018-12-08 PROCEDURE — 74011636320 HC RX REV CODE- 636/320: Performed by: RADIOLOGY

## 2018-12-08 RX ADMIN — IOPAMIDOL 100 ML: 612 INJECTION, SOLUTION INTRAVENOUS at 18:38

## 2018-12-27 ENCOUNTER — OFFICE VISIT (OUTPATIENT)
Dept: FAMILY MEDICINE CLINIC | Age: 43
End: 2018-12-27

## 2018-12-27 VITALS
WEIGHT: 213.4 LBS | SYSTOLIC BLOOD PRESSURE: 152 MMHG | DIASTOLIC BLOOD PRESSURE: 96 MMHG | HEART RATE: 73 BPM | HEIGHT: 62 IN | BODY MASS INDEX: 39.27 KG/M2 | TEMPERATURE: 98.2 F

## 2018-12-27 DIAGNOSIS — G45.3 AMAUROSIS FUGAX OF LEFT EYE: ICD-10-CM

## 2018-12-27 DIAGNOSIS — I10 ESSENTIAL HYPERTENSION: Primary | ICD-10-CM

## 2018-12-27 LAB — HGB BLD-MCNC: 14.3 G/DL

## 2018-12-27 RX ORDER — AMLODIPINE BESYLATE 5 MG/1
5 TABLET ORAL DAILY
Qty: 30 TAB | Refills: 5 | Status: SHIPPED | OUTPATIENT
Start: 2018-12-27 | End: 2019-01-24 | Stop reason: SDUPTHER

## 2018-12-27 RX ORDER — ASPIRIN 81 MG/1
81 TABLET ORAL DAILY
Qty: 30 TAB | Refills: 5 | Status: SHIPPED | OUTPATIENT
Start: 2018-12-27 | End: 2019-05-02 | Stop reason: SDUPTHER

## 2018-12-27 NOTE — PATIENT INSTRUCTIONS
Dieta DASH: Instrucciones de cuidado - [ DASH Diet: Care Instructions ]  Instrucciones de cuidado    La dieta DASH es un plan de alimentación que puede ayudar a bajar la presión arterial. DASH es la sigla en inglés de \"Dietary Approaches to Stop Hypertension\" (medidas dietéticas para disminuir la hipertensión). Hipertensión significa presión arterial surya. La dieta DASH se concentra en el consumo de alimentos con alto contenido de calcio, potasio y [place]. Estos nutrientes pueden disminuir la presión arterial. Los alimentos con el mayor contenido de [place] nutrientes son las frutas, las verduras, los productos lácteos de bajo contenido de [place], las nueces, las semillas y las legumbres. Helen lambert suplementos de calcio, potasio y magnesio, en lugar de comer alimentos ricos en estos nutrientes, no tiene el mismo efecto. La dieta DASH también incluye granos integrales, pescado y aves. La dieta DASH es ava de los cambios de estilo de jocelyn que quizá le recomiende li médico para reducir la presión arterial surya. Es posible que li médico también quiera que usted reduzca la cantidad de sodio en li Adam Douse. Reducir el sodio mientras sigue la dieta DASH puede bajar la presión arterial incluso más que únicamente con la dieta DASH. La atención de seguimiento es adriana parte clave de li tratamiento y seguridad. Asegúrese de hacer y acudir a todas las citas, y llame a il médico si está teniendo problemas. También es adriana buena idea saber los resultados de stephie exámenes y mantener adriana lista de los medicamentos que kristi. ¿Cómo puede cuidarse en el hogar? Cómo seguir la dieta DASH  · Coma entre 4 y 5 porciones de fruta al día. Adriana porción incluye 1 fruta de [place], ½ taza de fruta enlatada o cortada en trozos, 1/4 taza de fruta seca o 4 onzas (½ taza) de jugo de frutas. Elija fruta con más frecuencia que jugo. · Consuma entre 4 y 11 porciones de verduras al día.  Adriana porción incluye 1 taza de [address] verduras de hojas crudas, ½ taza de verduras cocidas o cortadas en trozos, o 4 onzas (½ taza) de jugo de verduras. Elija comer verduras con más frecuencia que lambert li jugo. · Consuma entre 2 y 3 porciones de lácteos semidescremados y descremados al día. Adriana porción incluye 8 onzas de [place], 1 taza de yogur o 1½ onzas de [place]. · Coma entre 6 y 6 porciones de granos todos los [address] Adriana porción incluye 1 rebanada de pan, 1 onza de cereal seco, o ½ taza de arroz, pasta o cereal cocido. Trate de elegir productos de grano integral con la mayor frecuencia posible. · Limite la cantidad de [place] aves y pescado a 2 porciones al día. Ceclia Rubicon porción son 3 onzas, lo que es aproximadamente el tamaño de un catalina de naipes. · Coma entre 4 y 5 porciones de nueces, semillas y legumbres (frijoles [habichuelas], lentejas y arvejas [chícharos] secos y cocidos) a la semana. Adriana porción incluye 1/3 taza de nueces, 2 cucharadas de semillas o ½ taza de frijoles o arvejas cocidos. · [address] [facility] y aceites a 2 o 3 porciones al día. Adriana porción es 1 cucharadita de aceite vegetal o 2 cucharadas de aderezo para ensaladas. · Limite los dulces y el azúcar adicional a 5 porciones o menos por semana. Ceclia Rubicon porción es 1 cucharada de [place], ½ taza de sorbete o 1 taza de limonada. · Consuma menos de 2,300 miligramos (mg) de sodio al día. Si limita li consumo de sodio a 1,500 mg al día, puede reducir li presión arterial aún más. Consejos para tener éxito  · Inicie con cambios pequeños. No intente hacer cambios radicales en li dieta de manera repentina. Podría sentir que extraña stephie comidas favoritas y, por lo [place], kirk adriana mayor probabilidad de que no cumpla el plan. Gradie Bartolome Thompson. Pee Pont que esos cambios se conviertan en un hábito, incorpore algunos [employer]. · Pruebe algo de lo siguiente:  ? Fíjese el objetivo de comer adriana porción de fruta o de verduras en todas las comidas y los refrigerios.  [place] facilitará alcanzar la cantidad diaria recomendada de frutas y verduras. ? Pruebe comer yogur cubierto con frutas frescas y nueces nj refrigerio o nj postre saludable. ? Agregue cristiane, tomate, pepino y cebolla a stephie sándwiches. ? Combine adriana masa de pizza precocida con queso mozzarella de bajo contenido de grasa (\"low-fat\") y cúbralo con abundantes verduras. Intente utilizar tomates, calabaza, espinaca, brócoli, zanahorias, coliflor y cebollas. ? Opte por comer adriana variedad de verduras cortadas en trozos con un aderezo de bajo contenido de grasa nj refrigerio, en lugar de \"chips\" (tipo colleen fritas) y un \"dip\" (producto untable). ? [place] semillas de girasol o almendras picadas [employer]. O intente agregar nueces o almendras picadas a las verduras cocidas. ? Pruebe algunas comidas vegetarianas con frijoles y arvejas. Haylie Juan C o frijoles rojos a las ensaladas. Prepare burritos y tacos con puré de frijoles pintos o negros. ¿Dónde puede encontrar más información en inglés? Maddison Goldstein a http://emily-aislinn.info/. Velma Nugent F540 en la búsqueda para aprender más acerca de \"Dieta DASH: Instrucciones de cuidado - [ DASH Diet: Care Instructions ]. \"  Revisado: 6 diciembre, 2017  Versión del contenido: 11.8  © 1119-4331 Healthwise, Incorporated. Las instrucciones de cuidado fueron adaptadas bajo licencia por [employer] (which disclaims liability or warranty for this information). Si usted tiene [place] afección médica o sobre estas instrucciones, siempre pregunte a li profesional de chan. [facility] niega toda garantía o responsabilidad por li uso de esta información. Presión arterial surya: Instrucciones de cuidado - [ High Blood Pressure: Care Instructions ]  Instrucciones de cuidado    Si li presión arterial suele ser superior a 130/80, usted tiene presión arterial surya o hipertensión.  [place] significa que el número de [facility] es [address] que [address] de abajo es 80 o [address], o ambas cosas. A pesar de lo que mucha gente ramya, la hipertensión no suele causar dolor de flo ni provocar mareos o aturdimiento. Generalmente, no presenta síntomas. Sin embargo, aumenta el riesgo de ataque al corazón, ataque cerebral, y daños en los riñones o en los ojos. A mayor presión arterial, mayor riesgo. Li médico le dará adriana meta para li presión arterial. Li meta se basará en li chan y li edad. Los cambios de estilo de jocelyn, nj alimentarse en forma saludable y [place], siempre son importantes para ayudarle a bajar la presión arterial. También podría lambert medicamentos para lograr li meta para la presión arterial.  La atención de seguimiento es adriana parte clave de li tratamiento y seguridad. Asegúrese de hacer y acudir a todas las citas, y llame a li médico si está teniendo problemas. También es adriana buena idea saber los resultados de stephie exámenes y mantener adriana lista de los medicamentos que kristi. ¿Cómo puede cuidarse en el hogar? Tratamiento médico  · Si sonido de lambert stephie medicamentos, li presión arterial volverá a subir. Es posible que deba lambert un tipo de [employer], o más de [place], para reducir la presión arterial. Sea ant con los medicamentos. [place] li medicamento exactamente nj se lo hayan indicado. Llame a li médico si ramya estar teniendo problemas con li medicamento. · Hable con li médico antes de empezar a lambert [employer]. La aspirina puede ayudar a determinadas personas a reducir li riesgo de tener un ataque cardíaco o un ataque cerebral. Helen lambert aspirina no es adecuado para todo el TRENGEREID, debido a que puede causar sangrado grave. · Visite a li médico periódicamente.  Usted podría necesitar consultar a li médico con más frecuencia al principio o hasta que se reduzca li presión arterial.  · Si usted está tomando medicamentos para la presión arterial, hable con li médico antes de lambert medicamentos descongestionantes o antiinflamatorios, nj ibuprofeno. Algunos de estos medicamentos pueden elevar la presión arterial.  · Aprenda a revisarse la presión arterial en li hogar. Cambios en el estilo de jocelyn  · Mantenga un peso saludable. [place] es particularmente importante si aumenta de peso en la andreina de la cintura. Bajar solo 10 libras (4.5 kg) puede ayudarle a reducir li presión arterial.  · Michelle más ejercicios si li médico se lo recomienda. Caminar es adriana buena opción. Poco a poco, aumente la distancia que [employer]. Intente hacer por lo menos 30 minutos de ejercicio la mayoría de los días de la [place]. También podría nadar, montar en bicicleta o hacer otras actividades. · No tome alcohol o limite la cantidad que seema. Hable con li médico acerca de si puede lambert alcohol. · Trate de limitar la cantidad de sodio que consume a menos de 2,300 miligramos (mg) al día. Li médico podría pedirle que trate de consumir menos de 1,500 mg al día. · Coma abundantes frutas (nj bananas [plátanos] y naranjas), verduras, legumbres, granos integrales y productos lácteos de bajo contenido de [place]. · Reduzca la cantidad de grasas saturadas en li dieta. Los productos derivados de los [place], nj la [place], el queso y la carne, contienen grasas saturadas. El limitar estos alimentos podría ayudarle a bajar de peso y [place] reducir li riesgo de adriana enfermedad cardíaca. · No fume. El hábito de fumar aumenta li riesgo de ataque cerebral y ataque al corazón. Si necesita ayuda para dejar de fumar, hable con li médico sobre programas y medicamentos para dejar de fumar. Estos pueden aumentar stephie probabilidades de dejar el hábito para siempre. ¿Cuándo debe pedir ayuda? Llame al 911 en cualquier momento que considere que necesita atención de [place]. [place] puede significar que tiene síntomas que sugieren que li presión arterial le está causando un problema cardíaco o vascular grave.  Li presión arterial podría ser superior a 180/110.   Por ejemplo, llame al 911 si:    · Tiene síntomas de un ataque al corazón. Estos pueden incluir:  ? [place] en el pecho, o adriana sensación extraña en el pecho.  ? Sudoración. ? Falta de aire. ? Náuseas o vómito. ? Dolor, presión o adriana sensación extraña en la espalda, el tu, la mandíbula, la parte superior del abdomen o en ava o ambos hombros o brazos. ? Aturdimiento o debilidad repentina. ? Latidos del corazón rápidos o irregulares.     · Tiene síntomas de un ataque cerebral. Estos pueden incluir:  ? Entumecimiento, hormigueo, debilidad o parálisis repentinos en la clem, el brazo o la pierna, sobre todo en un solo lado del cuerpo. ? Cambios repentinos en la visión. ? Dificultades repentinas para hablar. ? Confusión repentina o dificultad súbita para comprender frases sencillas. ? Problemas repentinos para caminar o mantener el equilibrio. ? Dolor de [place] intenso y repentino, distinto de los sravanthi de flo anteriores.     · Tiene un dolor intenso en la espalda o el abdomen.    No espere a que la presión arterial baje por sí serg. Obtenga ayuda de inmediato.   Llame a li médico ahora mismo o busque atención de inmediato si:    · Tiene la presión arterial mucho más surya de lo normal (nj 180/110 o más surya), ashish no tiene síntomas.     · Piensa que la presión arterial surya le está provocando síntomas, nj:  ? Dolor de flo intenso. ? Visión borrosa.    Vigile de cerca los cambios en li chan, y asegúrese de comunicarse con li médico si:    · Li presión arterial mide 140/90 o más en al menos 2 ocasiones. [place] significa que el número de [place] es 140 o superior o el número de abajo es 80 o superior, o ambas cosas.     · [place] que podría estar teniendo efectos secundarios de los medicamentos para la presión arterial.     · Li presión arterial suele ser normal, ashish se eleva por encima de lo normal en al menos 2 ocasiones.    ¿Dónde puede encontrar más información en sis [place] a http://huang.info/. Leslie Chavezda L671 en la búsqueda para aprender más acerca de \"Presión arterial surya: Instrucciones de cuidado - [ High Blood Pressure: Care Instructions ]. \"  Revisado: 6 diciembre, 2017  Versión del contenido: 11.8  © 3514-4565 Healthwise, Incorporated. Las instrucciones de cuidado fueron adaptadas bajo licencia por Good [employer]ctions (which disclaims liability or warranty for this information). Si usted tiene [place] afección médica o sobre estas instrucciones, siempre pregunte a li profesional de chan. Healthwise, Incorporated niega toda garantía o responsabilidad por li uso de esta información.

## 2018-12-27 NOTE — PROGRESS NOTES
At discharge station AVS was printed and reviewed with pt with Sebastian Beavers as . Pt given Good [facility]  today for : gave pt good rx card for wallet. I printed CT scan results and gave pt copy. Pt taken to registration to make return appointments as directed by provider today. Jovanni Riley RN  .

## 2018-12-27 NOTE — PROGRESS NOTES
HISTORY OF PRESENT ILLNESS  Madison Richardson is a 41 y.o. female. HPI  For about 1 year she has had amaurosis fugax symptoms. She mention during the ob care,  It wasn't until now that she was ordered. She is only taking labetalol since her pregnancy. Takes it twice a day. Also She didn't take medication for about 1 week, when she restarted she was not feeling well. Her symptoms then improved. Review of Systems   Constitutional: Negative for chills, fever and weight loss. Eyes: Positive for blurred vision. Negative for pain, discharge and redness. Changes in vision x 1 year   Respiratory: Negative for cough, shortness of breath and wheezing. Cardiovascular: Negative for chest pain and palpitations. Gastrointestinal: Negative for heartburn, nausea and vomiting. Musculoskeletal: Negative for back pain, myalgias and neck pain. BP (!) 152/96 (BP 1 Location: Left arm, BP Patient Position: Sitting)   Pulse 73   Temp 98.2 °F (36.8 °C) (Oral)   Ht 5' 2.01\" (1.575 m)   Wt 213 lb 6.4 oz (96.8 kg)   BMI 39.02 kg/m²   Physical Exam   Constitutional: She is oriented to person, place, and time. She appears well-developed and well-nourished. HENT:   Head: Normocephalic. Right Ear: External ear normal.   Left Ear: External ear normal.   Eyes: Conjunctivae and EOM are normal. Pupils are equal, round, and reactive to light. Neck: Normal range of motion. Neck supple. No thyromegaly present. Cardiovascular: Normal rate, regular rhythm and normal heart sounds. No murmur heard. Pulmonary/Chest: Effort normal and breath sounds normal. No respiratory distress. She has no wheezes. She has no rales. Abdominal: Soft. Bowel sounds are normal. She exhibits no distension. There is no tenderness. Musculoskeletal: Normal range of motion. She exhibits no edema, tenderness or deformity. Neurological: She is alert and oriented to person, place, and time. She has normal reflexes.    Skin: Skin is warm. No erythema. ASSESSMENT and PLAN  Diagnoses and all orders for this visit:    1. Essential hypertension  -     AMB POC HEMOGLOBIN (HGB)  -     amLODIPine (NORVASC) 5 mg tablet; Take 1 Tab by mouth daily. 2. Amaurosis fugax of left eye  -     aspirin delayed-release 81 mg tablet; Take 1 Tab by mouth daily. vision changes of unclear etiology,  Ct scan results discussed, normal.  She needs to have evaluation by ophthalmology.   Blood pressure not at target, will add amlodipine 5 mg once a day  Start aspirin 81 mg once a day  Follow up in 4 weeks

## 2018-12-27 NOTE — PROGRESS NOTES
Coordination of Care  1. Have you been to the ER, urgent care clinic since your last visit? Hospitalized since your last visit? No    2. Have you seen or consulted any other health care providers outside of the [facility] since your last visit? Include any pap smears or colon screening. No    Does the patient need refills?  NO    Learning Assessment Complete? yes    Results for orders placed or performed in visit on 12/27/18   AMB POC HEMOGLOBIN (HGB)   Result Value Ref Range    Hemoglobin (POC) 14.3

## 2018-12-28 ENCOUNTER — OFFICE VISIT (OUTPATIENT)
Dept: FAMILY MEDICINE CLINIC | Age: 43
End: 2018-12-28

## 2018-12-28 DIAGNOSIS — Z71.89 COUNSELING AND COORDINATION OF CARE: Primary | ICD-10-CM

## 2019-01-24 ENCOUNTER — OFFICE VISIT (OUTPATIENT)
Dept: FAMILY MEDICINE CLINIC | Age: 44
End: 2019-01-24

## 2019-01-24 VITALS
WEIGHT: 206 LBS | HEART RATE: 91 BPM | BODY MASS INDEX: 37.67 KG/M2 | DIASTOLIC BLOOD PRESSURE: 95 MMHG | SYSTOLIC BLOOD PRESSURE: 167 MMHG | TEMPERATURE: 98.2 F

## 2019-01-24 DIAGNOSIS — I10 ESSENTIAL HYPERTENSION: ICD-10-CM

## 2019-01-24 RX ORDER — HYDROCHLOROTHIAZIDE 25 MG/1
25 TABLET ORAL DAILY
Qty: 30 TAB | Refills: 5 | Status: SHIPPED | OUTPATIENT
Start: 2019-01-24 | End: 2019-05-02 | Stop reason: SDUPTHER

## 2019-01-24 RX ORDER — AMLODIPINE BESYLATE 10 MG/1
10 TABLET ORAL DAILY
Qty: 30 TAB | Refills: 5 | Status: SHIPPED | OUTPATIENT
Start: 2019-01-24 | End: 2019-05-02 | Stop reason: SDUPTHER

## 2019-01-24 NOTE — PROGRESS NOTES
HISTORY OF PRESENT ILLNESS  Kyrie Decker is a 41 y.o. female. HPI  Patient states she is doing well. Has not had any problems. Her weight has decreased. Still has some vision disturbances. She is trying to get the paperwork ready to be able to see ophthalmology. She has only been taking amlodipine and aspirin. She has never had a reaction to any medication in the past.  She had tubal ligation  Review of Systems   Constitutional: Negative for chills, fever and weight loss. Eyes: Positive for blurred vision. Negative for pain, discharge and redness. Changes in vision x 1 year   Respiratory: Negative for cough, shortness of breath and wheezing. Cardiovascular: Negative for chest pain and palpitations. Gastrointestinal: Negative for heartburn, nausea and vomiting. Musculoskeletal: Negative for back pain, myalgias and neck pain. BP (!) 167/95 (BP 1 Location: Left arm)   Pulse 91   Temp 98.2 °F (36.8 °C) (Oral)   Wt 206 lb (93.4 kg)   LMP  (LMP Unknown)   BMI 37.67 kg/m²   Physical Exam   Constitutional: She is oriented to person, place, and time. She appears well-developed and well-nourished. HENT:   Head: Normocephalic. Right Ear: External ear normal.   Left Ear: External ear normal.   Eyes: Conjunctivae and EOM are normal. Pupils are equal, round, and reactive to light. Neck: Normal range of motion. Neck supple. No thyromegaly present. Cardiovascular: Normal rate, regular rhythm and normal heart sounds. No murmur heard. Pulmonary/Chest: Effort normal and breath sounds normal. No respiratory distress. She has no wheezes. She has no rales. Abdominal: Soft. Bowel sounds are normal. She exhibits no distension. There is no tenderness. Musculoskeletal: Normal range of motion. She exhibits no edema, tenderness or deformity. Neurological: She is alert and oriented to person, place, and time. She has normal reflexes. Skin: Skin is warm. No erythema.        ASSESSMENT and PLAN  Diagnoses and all orders for this visit:    1. Essential hypertension  -     amLODIPine (NORVASC) 10 mg tablet; Take 1 Tab by mouth daily. -     hydroCHLOROthiazide (HYDRODIURIL) 25 mg tablet; Take 1 Tab by mouth daily. Blood pressure is uncontrolled, will add hctz and increase amlodipine.   Follow up in 1 month

## 2019-01-24 NOTE — PROGRESS NOTES
Patient could not stay any longer for discharge which was running behind. The patient was given a Good Rx coupon card and stated she knew about her blood pressure prescriptions and where to pick them up. She has been to registration to schedule a 6 week provider follow-up appointment.  Amarilis Rodarte RN

## 2019-01-24 NOTE — PROGRESS NOTES
Coordination of Care  1. Have you been to the ER, urgent care clinic since your last visit? Hospitalized since your last visit? No    2. Have you seen or consulted any other health care providers outside of the [address] [place] [address] since your last visit? Include any pap smears or colon screening. No    Does the patient need refills? NO    Learning Assessment Complete?  yes  Depression Screening complete in the past 12 months? yes

## 2019-03-12 ENCOUNTER — TELEPHONE (OUTPATIENT)
Dept: FAMILY MEDICINE CLINIC | Age: 44
End: 2019-03-12

## 2019-05-02 ENCOUNTER — OFFICE VISIT (OUTPATIENT)
Dept: FAMILY MEDICINE CLINIC | Age: 44
End: 2019-05-02

## 2019-05-02 VITALS
SYSTOLIC BLOOD PRESSURE: 138 MMHG | WEIGHT: 200.62 LBS | DIASTOLIC BLOOD PRESSURE: 80 MMHG | HEART RATE: 82 BPM | HEIGHT: 62 IN | OXYGEN SATURATION: 98 % | BODY MASS INDEX: 36.92 KG/M2

## 2019-05-02 DIAGNOSIS — G45.3 AMAUROSIS FUGAX OF LEFT EYE: ICD-10-CM

## 2019-05-02 DIAGNOSIS — I10 ESSENTIAL HYPERTENSION: Primary | ICD-10-CM

## 2019-05-02 PROCEDURE — 80053 COMPREHEN METABOLIC PANEL: CPT

## 2019-05-02 PROCEDURE — 83036 HEMOGLOBIN GLYCOSYLATED A1C: CPT

## 2019-05-02 PROCEDURE — 85025 COMPLETE CBC W/AUTO DIFF WBC: CPT

## 2019-05-02 PROCEDURE — 84443 ASSAY THYROID STIM HORMONE: CPT

## 2019-05-02 PROCEDURE — 80061 LIPID PANEL: CPT

## 2019-05-02 RX ORDER — ASPIRIN 81 MG/1
81 TABLET ORAL DAILY
Qty: 90 TAB | Refills: 3 | Status: SHIPPED | OUTPATIENT
Start: 2019-05-02 | End: 2019-08-22 | Stop reason: SDUPTHER

## 2019-05-02 RX ORDER — AMLODIPINE BESYLATE 10 MG/1
10 TABLET ORAL DAILY
Qty: 90 TAB | Refills: 3 | Status: SHIPPED | OUTPATIENT
Start: 2019-05-02 | End: 2019-08-22 | Stop reason: SDUPTHER

## 2019-05-02 RX ORDER — HYDROCHLOROTHIAZIDE 25 MG/1
25 TABLET ORAL DAILY
Qty: 90 TAB | Refills: 3 | Status: SHIPPED | OUTPATIENT
Start: 2019-05-02 | End: 2019-08-22 | Stop reason: SDUPTHER

## 2019-05-02 NOTE — PROGRESS NOTES
Patient seen for discharge to review the AVS, prescriptions and pharmacy location. Per the Good Rx website, no coupons are needed for her prescriptions. The HCTZ and amlodipine are on the [employer] $10 list. Patient expressed understanding and will go now to registration to schedule a provider follow-up appointment.  Waleska Curiel RN

## 2019-05-02 NOTE — PROGRESS NOTES
HISTORY OF PRESENT ILLNESS  Brodie Siddiqi is a 41 y.o. female. HPI  Patient states She has been able to loose weight. Patient has not been able to see the ophthalmology. Because she has not been able to get the card notarized. Needs medication refills today. No other concerns    Review of Systems   Constitutional: Negative for chills, fever and weight loss. Eyes: Positive for blurred vision. Negative for pain, discharge and redness. Changes in vision x 1 year   Respiratory: Negative for cough, shortness of breath and wheezing. Cardiovascular: Negative for chest pain and palpitations. Gastrointestinal: Negative for heartburn, nausea and vomiting. Musculoskeletal: Negative for back pain, myalgias and neck pain. /80 (BP 1 Location: Right arm, BP Patient Position: Sitting)   Pulse 82   Ht 5' 1.81\" (1.57 m)   Wt 200 lb 9.9 oz (91 kg)   SpO2 98%   BMI 36.92 kg/m²   Physical Exam   Constitutional: She is oriented to person, place, and time. She appears well-developed and well-nourished. HENT:   Head: Normocephalic. Right Ear: External ear normal.   Left Ear: External ear normal.   Eyes: Pupils are equal, round, and reactive to light. Conjunctivae and EOM are normal.   Neck: Normal range of motion. Neck supple. No thyromegaly present. Cardiovascular: Normal rate, regular rhythm and normal heart sounds. No murmur heard. Pulmonary/Chest: Effort normal and breath sounds normal. No respiratory distress. She has no wheezes. She has no rales. Abdominal: Soft. Bowel sounds are normal. She exhibits no distension. There is no tenderness. Musculoskeletal: Normal range of motion. She exhibits no edema, tenderness or deformity. Neurological: She is alert and oriented to person, place, and time. She has normal reflexes. Skin: Skin is warm. No erythema. ASSESSMENT and PLAN  Diagnoses and all orders for this visit:    1.  Essential hypertension  -     hydroCHLOROthiazide (HYDRODIURIL) 25 mg tablet; Take 1 Tab by mouth daily. -     amLODIPine (NORVASC) 10 mg tablet; Take 1 Tab by mouth daily.  -     LIPID PANEL; Future  -     METABOLIC PANEL, COMPREHENSIVE; Future  -     HEMOGLOBIN A1C WITH EAG; Future  -     TSH 3RD GENERATION; Future  -     CBC WITH AUTOMATED DIFF; Future    2. Amaurosis fugax of left eye  -     aspirin delayed-release 81 mg tablet; Take 1 Tab by mouth daily.       We will refill medications  Labs today  Follow up as needed

## 2019-05-02 NOTE — PROGRESS NOTES
Coordination of Care  1. Have you been to the ER, urgent care clinic since your last visit? Hospitalized since your last visit? No    2. Have you seen or consulted any other health care providers outside of the [address] [place] [address] since your last visit? Include any pap smears or colon screening. No    Does the patient need refills? YES    Learning Assessment Complete?  yes  Depression Screening complete in the past 12 months? yes

## 2019-05-03 ENCOUNTER — HOSPITAL ENCOUNTER (OUTPATIENT)
Dept: LAB | Age: 44
Discharge: HOME | End: 2019-05-03

## 2019-05-03 DIAGNOSIS — I10 ESSENTIAL HYPERTENSION: ICD-10-CM

## 2019-05-03 LAB
ALBUMIN SERPL-MCNC: 3.6 G/DL (ref 3.5–5)
ALBUMIN/GLOB SERPL: 0.9 {RATIO} (ref 1.1–2.2)
ALP SERPL-CCNC: 114 U/L (ref 45–117)
ALT SERPL-CCNC: 20 U/L (ref 12–78)
ANION GAP SERPL CALC-SCNC: 6 MMOL/L (ref 5–15)
AST SERPL-CCNC: 16 U/L (ref 15–37)
BASOPHILS # BLD: 0.1 K/UL (ref 0–0.1)
BASOPHILS NFR BLD: 1 % (ref 0–1)
BILIRUB SERPL-MCNC: 0.3 MG/DL (ref 0.2–1)
BUN SERPL-MCNC: 16 MG/DL (ref 6–20)
BUN/CREAT SERPL: 22 (ref 12–20)
CALCIUM SERPL-MCNC: 8.7 MG/DL (ref 8.5–10.1)
CHLORIDE SERPL-SCNC: 108 MMOL/L (ref 97–108)
CHOLEST SERPL-MCNC: 193 MG/DL
CO2 SERPL-SCNC: 27 MMOL/L (ref 21–32)
CREAT SERPL-MCNC: 0.72 MG/DL (ref 0.55–1.02)
DIFFERENTIAL METHOD BLD: ABNORMAL
EOSINOPHIL # BLD: 0.2 K/UL (ref 0–0.4)
EOSINOPHIL NFR BLD: 3 % (ref 0–7)
ERYTHROCYTE [DISTWIDTH] IN BLOOD BY AUTOMATED COUNT: 14 % (ref 11.5–14.5)
EST. AVERAGE GLUCOSE BLD GHB EST-MCNC: 103 MG/DL
GLOBULIN SER CALC-MCNC: 3.8 G/DL (ref 2–4)
GLUCOSE SERPL-MCNC: 81 MG/DL (ref 65–100)
HBA1C MFR BLD: 5.2 % (ref 4.2–6.3)
HCT VFR BLD AUTO: 40.9 % (ref 35–47)
HDLC SERPL-MCNC: 43 MG/DL
HDLC SERPL: 4.5 {RATIO} (ref 0–5)
HGB BLD-MCNC: 12.9 G/DL (ref 11.5–16)
IMM GRANULOCYTES # BLD AUTO: 0 K/UL (ref 0–0.04)
IMM GRANULOCYTES NFR BLD AUTO: 0 % (ref 0–0.5)
LDLC SERPL CALC-MCNC: 108.6 MG/DL (ref 0–100)
LIPID PROFILE,FLP: ABNORMAL
LYMPHOCYTES # BLD: 3.6 K/UL (ref 0.8–3.5)
LYMPHOCYTES NFR BLD: 46 % (ref 12–49)
MCH RBC QN AUTO: 27.5 PG (ref 26–34)
MCHC RBC AUTO-ENTMCNC: 31.5 G/DL (ref 30–36.5)
MCV RBC AUTO: 87.2 FL (ref 80–99)
MONOCYTES # BLD: 0.6 K/UL (ref 0–1)
MONOCYTES NFR BLD: 8 % (ref 5–13)
NEUTS SEG # BLD: 3.3 K/UL (ref 1.8–8)
NEUTS SEG NFR BLD: 42 % (ref 32–75)
NRBC # BLD: 0 K/UL (ref 0–0.01)
NRBC BLD-RTO: 0 PER 100 WBC
PLATELET # BLD AUTO: 346 K/UL (ref 150–400)
PMV BLD AUTO: 11 FL (ref 8.9–12.9)
POTASSIUM SERPL-SCNC: 4.3 MMOL/L (ref 3.5–5.1)
PROT SERPL-MCNC: 7.4 G/DL (ref 6.4–8.2)
RBC # BLD AUTO: 4.69 M/UL (ref 3.8–5.2)
SODIUM SERPL-SCNC: 141 MMOL/L (ref 136–145)
TRIGL SERPL-MCNC: 207 MG/DL (ref ?–150)
TSH SERPL DL<=0.05 MIU/L-ACNC: 1.99 UIU/ML (ref 0.36–3.74)
VLDLC SERPL CALC-MCNC: 41.4 MG/DL
WBC # BLD AUTO: 7.7 K/UL (ref 3.6–11)

## 2019-05-06 NOTE — PROGRESS NOTES
Normal blood count, kidney function, liver function test, electrolytes, cholesterol, thyroid, and test for diabetes is negative  Triglycerides are a little elevated,  Needs to adjust her diet, avoid process foods, greasy foods and exercise  Patient can be informed

## 2019-06-17 NOTE — PROGRESS NOTES
I called pt today with [facility]  # [phone] and gave message from provider. I was working with Irvin Wren LPN today to make this call.  Munira Riddle RN

## 2019-08-22 ENCOUNTER — OFFICE VISIT (OUTPATIENT)
Dept: FAMILY MEDICINE CLINIC | Age: 44
End: 2019-08-22

## 2019-08-22 VITALS
HEART RATE: 79 BPM | TEMPERATURE: 98.3 F | BODY MASS INDEX: 35.91 KG/M2 | WEIGHT: 190.2 LBS | SYSTOLIC BLOOD PRESSURE: 138 MMHG | HEIGHT: 61 IN | DIASTOLIC BLOOD PRESSURE: 81 MMHG

## 2019-08-22 DIAGNOSIS — Z12.31 OTHER SCREENING MAMMOGRAM: ICD-10-CM

## 2019-08-22 DIAGNOSIS — I10 ESSENTIAL HYPERTENSION: ICD-10-CM

## 2019-08-22 DIAGNOSIS — M25.562 ACUTE PAIN OF LEFT KNEE: Primary | ICD-10-CM

## 2019-08-22 DIAGNOSIS — G45.3 AMAUROSIS FUGAX OF LEFT EYE: ICD-10-CM

## 2019-08-22 RX ORDER — AMLODIPINE BESYLATE 10 MG/1
10 TABLET ORAL DAILY
Qty: 90 TAB | Refills: 3 | Status: SHIPPED | OUTPATIENT
Start: 2019-08-22 | End: 2020-04-01 | Stop reason: SDUPTHER

## 2019-08-22 RX ORDER — ASPIRIN 81 MG/1
81 TABLET ORAL DAILY
Qty: 90 TAB | Refills: 3 | Status: SHIPPED | OUTPATIENT
Start: 2019-08-22 | End: 2020-04-01 | Stop reason: SDUPTHER

## 2019-08-22 RX ORDER — HYDROCHLOROTHIAZIDE 25 MG/1
25 TABLET ORAL DAILY
Qty: 90 TAB | Refills: 3 | Status: SHIPPED | OUTPATIENT
Start: 2019-08-22 | End: 2020-04-01 | Stop reason: SDUPTHER

## 2019-08-22 RX ORDER — DICLOFENAC SODIUM 10 MG/G
4 GEL TOPICAL 3 TIMES DAILY
Qty: 100 G | Refills: 1 | Status: SHIPPED | OUTPATIENT
Start: 2019-08-22 | End: 2020-04-01

## 2019-08-22 NOTE — PROGRESS NOTES
Printed AVS, provided to pt and reviewed. Pt indicated understanding and had no questions. Told pt that rx's have been sent to pharmacy and they should be ready for  in approximately 2 hrs. Pt told to please present GoodRx. com coupon which we provide to your pharmacy to receive discounted price. Reviewed all medication ordered today with the pt. Provided pt with information and phone # for [facility]. Explained that they will do a financial screening before scheduling appt. Jaron Cazares was the .   Arya Tate RN

## 2019-08-22 NOTE — PROGRESS NOTES
Coordination of Care  1. Have you been to the ER, urgent care clinic since your last visit? Hospitalized since your last visit? No    2. Have you seen or consulted any other health care providers outside of the [address] [place] [address] since your last visit? Include any pap smears or colon screening. No    Does the patient need refills? YES    Learning Assessment Complete?  yes  Depression Screening complete in the past 12 months? yes

## 2019-08-22 NOTE — PROGRESS NOTES
HISTORY OF PRESENT ILLNESS  Sherman Fraga is a 41 y.o. female. HPI  Patient states she sometimes gets cramps of the hands. Also She has been working for about 1 month. Her left knee becomes painful. She works cleaning. She sometimes have to be kneeling and is painful. Sometimes she has leg swelling. Review of Systems   Constitutional: Negative for chills, fever and weight loss. Eyes: Negative for blurred vision, pain, discharge and redness. Respiratory: Negative for cough, shortness of breath and wheezing. Cardiovascular: Negative for chest pain and palpitations. Gastrointestinal: Negative for heartburn, nausea and vomiting. Musculoskeletal: Negative for back pain, myalgias and neck pain. /81 (BP 1 Location: Right arm)   Pulse 79   Temp 98.3 °F (36.8 °C) (Oral)   Ht 5' 1.22\" (1.555 m)   Wt 190 lb 3.2 oz (86.3 kg)   LMP 08/15/2019   BMI 35.68 kg/m²   Physical Exam   Constitutional: She is oriented to person, place, and time. She appears well-developed and well-nourished. HENT:   Head: Normocephalic. Right Ear: External ear normal.   Left Ear: External ear normal.   Eyes: Pupils are equal, round, and reactive to light. Conjunctivae and EOM are normal.   Neck: Normal range of motion. Neck supple. No thyromegaly present. Cardiovascular: Normal rate, regular rhythm and normal heart sounds. No murmur heard. Pulmonary/Chest: Effort normal and breath sounds normal. No respiratory distress. She has no wheezes. She has no rales. Abdominal: Soft. Bowel sounds are normal. She exhibits no distension. There is no tenderness. Musculoskeletal: Normal range of motion. She exhibits tenderness. She exhibits no edema or deformity. Left knee pain to palpation, normal ACL/PCL, meniscus, lateral and medial collateral ligaments   Neurological: She is alert and oriented to person, place, and time. She has normal reflexes. Skin: Skin is warm. No erythema.        ASSESSMENT and PLAN  Diagnoses and all orders for this visit:    1. Acute pain of left knee  -     diclofenac (VOLTAREN) 1 % gel; Apply 4 g to affected area three (3) times daily. Left knee    2. Essential hypertension  -     hydroCHLOROthiazide (HYDRODIURIL) 25 mg tablet; Take 1 Tab by mouth daily. -     amLODIPine (NORVASC) 10 mg tablet; Take 1 Tab by mouth daily. 3. Amaurosis fugax of left eye  -     aspirin delayed-release 81 mg tablet; Take 1 Tab by mouth daily. 4. Other screening mammogram  -     [facility] MAMMO BI SCREENING INCL CAD;  Future      Medications refilled today  We will order pap smear and mammogram  Follow up every 6 months

## 2020-04-01 ENCOUNTER — OFFICE VISIT (OUTPATIENT)
Dept: FAMILY MEDICINE CLINIC | Age: 45
End: 2020-04-01

## 2020-04-01 DIAGNOSIS — I10 ESSENTIAL HYPERTENSION: Primary | ICD-10-CM

## 2020-04-01 DIAGNOSIS — R14.0 BLOATING: ICD-10-CM

## 2020-04-01 RX ORDER — AMLODIPINE BESYLATE 10 MG/1
10 TABLET ORAL DAILY
Qty: 90 TAB | Refills: 3 | Status: SHIPPED | OUTPATIENT
Start: 2020-04-01 | End: 2020-07-09

## 2020-04-01 RX ORDER — ASPIRIN 81 MG/1
81 TABLET ORAL DAILY
Qty: 90 TAB | Refills: 3 | Status: SHIPPED | OUTPATIENT
Start: 2020-04-01 | End: 2020-07-09

## 2020-04-01 RX ORDER — DICYCLOMINE HYDROCHLORIDE 20 MG/1
20 TABLET ORAL
Qty: 90 TAB | Refills: 2 | Status: SHIPPED | OUTPATIENT
Start: 2020-04-01 | End: 2020-07-09

## 2020-04-01 RX ORDER — HYDROCHLOROTHIAZIDE 25 MG/1
25 TABLET ORAL DAILY
Qty: 90 TAB | Refills: 3 | Status: SHIPPED | OUTPATIENT
Start: 2020-04-01 | End: 2020-07-09 | Stop reason: SDUPTHER

## 2020-04-01 NOTE — PROGRESS NOTES
HISTORY OF PRESENT ILLNESS  Gisela Orozco is a 45 y.o. female. HPI  Patient agrees to telemedicine, communication done via phone. Patient states she is doing well, denies chest pain, no shortness of breath. Is in need of refills. Also states she has some abdominal discomfort. She feels upset stomach and bloating. Denies any heartburn. Some meals she eats, especially greasy foods may make her symptoms worse. Review of Systems   Constitutional: Negative for fever. Respiratory: Negative for cough. Cardiovascular: Negative for chest pain and palpitations. Gastrointestinal: Positive for abdominal pain. Negative for constipation, diarrhea, heartburn, nausea and vomiting. Physical Exam    ASSESSMENT and PLAN  Diagnoses and all orders for this visit:    1. Essential hypertension  -     hydroCHLOROthiazide (HYDRODIURIL) 25 mg tablet; Take 1 Tab by mouth daily. -     aspirin delayed-release 81 mg tablet; Take 1 Tab by mouth daily. -     amLODIPine (NORVASC) 10 mg tablet; Take 1 Tab by mouth daily. 2. Bloating  -     dicyclomine (BENTYL) 20 mg tablet; Take 1 Tab by mouth three (3) times daily (with meals).       We will refill her blood pressure medications and aspirine  May take bentyl tid with meals  Follow up in 3 months

## 2020-04-01 NOTE — PROGRESS NOTES
Avs discussed with Breanna Javier by Discharge Nurse Tyrel Pitt LPN. Discussed medications prescribed today. Good rx coupon sent to pt via text. Patient verbalized understanding and has no further questions.

## 2020-07-09 ENCOUNTER — TELEPHONE (OUTPATIENT)
Dept: FAMILY MEDICINE CLINIC | Age: 45
End: 2020-07-09

## 2020-07-09 ENCOUNTER — OFFICE VISIT (OUTPATIENT)
Dept: FAMILY MEDICINE CLINIC | Age: 45
End: 2020-07-09

## 2020-07-09 VITALS — HEIGHT: 61 IN | WEIGHT: 204 LBS | BODY MASS INDEX: 38.51 KG/M2

## 2020-07-09 DIAGNOSIS — I10 ESSENTIAL HYPERTENSION: ICD-10-CM

## 2020-07-09 RX ORDER — HYDROCHLOROTHIAZIDE 25 MG/1
25 TABLET ORAL DAILY
Qty: 90 TAB | Refills: 3 | Status: SHIPPED | OUTPATIENT
Start: 2020-07-09 | End: 2021-06-10 | Stop reason: SDUPTHER

## 2020-07-09 NOTE — PROGRESS NOTES
HISTORY OF PRESENT ILLNESS  Jr Ferguson is a 45 y.o. female. HPI  I was at home while conducting this encounter. Consent:  She and/or her healthcare decision maker is aware that this patient-initiated Telehealth encounter is a billable service, with coverage as determined by her insurance carrier. She is aware that she may receive a bill and has provided verbal consent to proceed: Yes    This virtual visit was conducted telephone encounter only. -  I affirm this is a Patient Initiated Episode with an Established Patient who has not had a related appointment within my department in the past 7 days or scheduled within the next 24 hours. Note: this encounter is not billable if this call serves to triage the patient into an appointment for the relevant concern. Total Time: minutes: 5-10 minutes. Patient states her blood pressure is good. She has no chest pain, no shortness of breath, no nausea, vomiting, diarrhea, or constipation. Needs to get a new prescription for hctz. She needs to go to [facility] for mammogram and pap smear. She was breastfeeding last year and had to cancel her appointment    ROS    Physical Exam    ASSESSMENT and PLAN  Diagnoses and all orders for this visit:    1. Essential hypertension  -     hydroCHLOROthiazide (HYDRODIURIL) 25 mg tablet; Take 1 Tab by mouth daily.       We will refer to [facility] for pap and mammogram

## 2020-07-09 NOTE — TELEPHONE ENCOUNTER
Assisted Tracey Brennan with calling patient and informing her of [facility] process and number. Pt stated that she was at work and unable to write number, will send number in a text message. Will message Clemencia Siu regarding this patient. Flakito Huizar.  [facility], [address] [facility]

## 2020-07-09 NOTE — PROGRESS NOTES
Coordination of Care  1. Have you been to the ER, urgent care clinic since your last visit? Hospitalized since your last visit? No    2. Have you seen or consulted any other health care providers outside of the [address] [place] [address] since your last visit? Include any pap smears or colon screening. No    Does the patient need refills? YES    Learning Assessment Complete?  yes  Depression Screening complete in the past 12 months? yes

## 2020-09-11 ENCOUNTER — OFFICE VISIT (OUTPATIENT)
Dept: FAMILY PLANNING/WOMEN'S HEALTH CLINIC | Age: 45
End: 2020-09-11

## 2020-09-11 ENCOUNTER — HOSPITAL ENCOUNTER (OUTPATIENT)
Dept: LAB | Age: 45
Discharge: HOME | End: 2020-09-11

## 2020-09-11 ENCOUNTER — HOSPITAL ENCOUNTER (OUTPATIENT)
Dept: MAMMOGRAPHY | Age: 45
Discharge: HOME | End: 2020-09-11

## 2020-09-11 DIAGNOSIS — Z12.31 VISIT FOR SCREENING MAMMOGRAM: ICD-10-CM

## 2020-09-11 DIAGNOSIS — Z01.419 WELL WOMAN EXAM WITH ROUTINE GYNECOLOGICAL EXAM: Primary | ICD-10-CM

## 2020-09-11 PROCEDURE — 77067 SCR MAMMO BI INCL CAD: CPT

## 2020-09-11 PROCEDURE — 88175 CYTOPATH C/V AUTO FLUID REDO: CPT

## 2020-09-11 NOTE — PROGRESS NOTES
EVERY WOMANS LIFE HISTORY QUESTIONNAIRE       No Yes Comments   Has a doctor ever seen or felt anything wrong with your breast? [x]                                  []                                     Have you ever had a breast biopsy? [x]                                  []                                          When and where was last mammogram performed? This is first one    Have you ever been told that there was a problem on your mammogram?   No Yes Comments   []                                  []                                  n/a     Do you have breast implants? No Yes Comments   [x]                                  []                                       When was your last Pap test performed? [facility]/13/2016 at [facility] you ever had an abnormal Pap test?   No Yes Comments   [x]                                  []                                       Have you had a hysterectomy? No Yes Comments (why)   [x]                                  []                                       Have you been through menopause? No Yes Date of LMP   [x]                                  []                                  9/7/2020 (only slight amount today)     Did your mother take VAISHALI? No Yes Unknown   []                                  []                                  X     Do you have a history of HIV exposure? No Yes    [x]                                  []                                       Have you ever been diagnosed with any type of Cancer   No Yes Comments (type,when,where,type of treatment   [x]                                  []                                          Has a family member been diagnosed with breast or ovarian cancer?    No Yes Comments (which family members, and type   [x]                                  []                                       Are you taking hormone replacement therapy (HRT)     No Yes Comments   [x]                                  [] How many times have you been pregnant? 3       Number of live births ? 3    Are you experiencing any of the following? No Yes Comments   Nipple Discharge [x]                                  []                                     Breast Lump/Masses [x]                                  []                                     Breast Skin Changes [x]                                  []                                          No Yes Comments   Vaginal Discharge [x]                                  []                                     Abnormal/unusual vaginal bleeding [x]                                  []                                         Are you experiencing any other health problems? HTN--goes to the [facility]        Age at first period? 25  Age at first birth?    30

## 2020-09-11 NOTE — PROGRESS NOTES
Assessment/Plan:    Diagnoses and all orders for this visit:    1. Well woman exam with routine gynecological exam  -     PAP IG, RFX APTIMA HPV ASCUS ([id number]))            LADI Martin expressed understanding of this plan. An AVS was printed and given to the patient.      ----------------------------------------------------------------------    Chief Complaint   Patient presents with    Well Woman     [facility] visit       History of Present Illness:  [facility] annual exam with mammo and pap today  No gyn problems to report  Not on OCP, having monthly periods   and no risk of DV. Sometimes intercourse is painful- she will try ky jelly  , all c-sections  Last pap          No past medical history on file. Current Outpatient Medications   Medication Sig Dispense Refill    hydroCHLOROthiazide (HYDRODIURIL) 25 mg tablet Take 1 Tab by mouth daily. 90 Tab 3       No Known Allergies    Social History     Tobacco Use    Smoking status: Never Smoker    Smokeless tobacco: Never Used   Substance Use Topics    Alcohol use: No     Alcohol/week: 0.0 standard drinks    Drug use: No       No family history on file. Physical Exam:     Visit Vitals  LMP 2020 (Exact Date)       A&Ox3  WDWN NAD  Respirations normal and non labored  Breast exam- sarwat neg for mass, dimpling, retractions, skin color changes  Pelvic- ext in crease of groin there is irritated, flat, pink rash c/w fungal infxn- recommended antifungal cream  Otherwise no lesions external genitalia. Cervix and vagina w/out lesion or discharge, punctate os.  Uterus and adnexal exam neg for mass or tenderness

## 2020-09-16 NOTE — PROGRESS NOTES
Neg pap, pt may be informed 3 year f/up for next pap. IF she develops a foul smelling discharge, she may call for rx.  There was no evidence of BV on exam

## 2021-06-10 ENCOUNTER — OFFICE VISIT (OUTPATIENT)
Dept: FAMILY MEDICINE CLINIC | Age: 46
End: 2021-06-10

## 2021-06-10 ENCOUNTER — HOSPITAL ENCOUNTER (OUTPATIENT)
Dept: LAB | Age: 46
Discharge: HOME | End: 2021-06-10

## 2021-06-10 VITALS
SYSTOLIC BLOOD PRESSURE: 173 MMHG | OXYGEN SATURATION: 99 % | HEART RATE: 81 BPM | HEIGHT: 62 IN | WEIGHT: 188 LBS | TEMPERATURE: 98.6 F | BODY MASS INDEX: 34.6 KG/M2 | DIASTOLIC BLOOD PRESSURE: 118 MMHG

## 2021-06-10 DIAGNOSIS — I10 ESSENTIAL HYPERTENSION: Primary | ICD-10-CM

## 2021-06-10 DIAGNOSIS — R59.0 LAD (LYMPHADENOPATHY) OF RIGHT CERVICAL REGION: ICD-10-CM

## 2021-06-10 DIAGNOSIS — I10 ESSENTIAL HYPERTENSION: ICD-10-CM

## 2021-06-10 PROCEDURE — 85025 COMPLETE CBC W/AUTO DIFF WBC: CPT

## 2021-06-10 PROCEDURE — 84443 ASSAY THYROID STIM HORMONE: CPT

## 2021-06-10 PROCEDURE — 83036 HEMOGLOBIN GLYCOSYLATED A1C: CPT

## 2021-06-10 PROCEDURE — 80053 COMPREHEN METABOLIC PANEL: CPT

## 2021-06-10 PROCEDURE — 99213 OFFICE O/P EST LOW 20 MIN: CPT | Performed by: FAMILY MEDICINE

## 2021-06-10 PROCEDURE — 80061 LIPID PANEL: CPT

## 2021-06-10 RX ORDER — CEPHALEXIN 500 MG/1
500 CAPSULE ORAL 2 TIMES DAILY
Qty: 10 CAPSULE | Refills: 0 | Status: SHIPPED | OUTPATIENT
Start: 2021-06-10 | End: 2021-06-15

## 2021-06-10 RX ORDER — HYDROCHLOROTHIAZIDE 25 MG/1
25 TABLET ORAL DAILY
Qty: 90 TABLET | Refills: 3 | Status: SHIPPED | OUTPATIENT
Start: 2021-06-10 | End: 2021-07-01

## 2021-06-10 RX ORDER — AMLODIPINE BESYLATE 5 MG/1
5 TABLET ORAL DAILY
Qty: 90 TABLET | Refills: 3 | Status: SHIPPED | OUTPATIENT
Start: 2021-06-10 | End: 2022-07-26 | Stop reason: DRUGHIGH

## 2021-06-10 NOTE — PROGRESS NOTES
I have copied the provider's check out note here and have reviewed these items with the patient today: Check-out Note: Labs today   Follow up in 6 weeks   Good rx  I have printed AVS and reviewed it with patient today.  #[id number] with [employer] assisted. Patient verbalized understanding. Yoanna Foy RN

## 2021-06-10 NOTE — PROGRESS NOTES
HISTORY OF PRESENT ILLNESS  Adeola Basilio is a 39 y.o. female. HPI  Patient's last appointment in person was in 2019. She used to also take amlodipine. She states she has occipital tension. Also started having pain behind the right ear yesterday and noticed a tender lump in this area    Has loss weight. She had worked with a nutritionist  Review of Systems   Constitutional: Negative for chills, fever and weight loss. Respiratory: Negative for cough, hemoptysis, shortness of breath and wheezing. Cardiovascular: Negative for chest pain and palpitations. Gastrointestinal: Negative for abdominal pain, constipation, diarrhea, heartburn, nausea and vomiting. Genitourinary: Negative for dysuria, frequency and urgency. Musculoskeletal: Negative for back pain, myalgias and neck pain. Skin:        Tender lump behind the right ear   BP (!) 173/118 (BP 1 Location: Left upper arm, BP Patient Position: Sitting)   Pulse 81   Temp 98.6 °F (37 °C) (Temporal)   Ht 5' 2\" (1.575 m)   Wt 188 lb (85.3 kg)   LMP 06/08/2021   SpO2 99%   BMI 34.39 kg/m²   Physical Exam  Constitutional:       General: She is not in acute distress. Appearance: Normal appearance. HENT:      Head: Normocephalic. Right Ear: Tympanic membrane normal.      Left Ear: Tympanic membrane normal.      Nose: Nose normal. No congestion. Mouth/Throat:      Mouth: Mucous membranes are moist.      Pharynx: No oropharyngeal exudate. Eyes:      Pupils: Pupils are equal, round, and reactive to light. Neck:      Comments: Right posterior tender lymph node, about 8 mm in diameter  Cardiovascular:      Rate and Rhythm: Normal rate and regular rhythm. Pulses: Normal pulses. Heart sounds: Normal heart sounds. No murmur heard. Pulmonary:      Effort: Pulmonary effort is normal.      Breath sounds: Normal breath sounds. No wheezing or rhonchi.    Abdominal:      General: Bowel sounds are normal. There is no distension. Palpations: Abdomen is soft. Tenderness: There is no abdominal tenderness. Hernia: No hernia is present. Musculoskeletal:         General: No swelling or tenderness. Normal range of motion. Lymphadenopathy:      Cervical: Cervical adenopathy present. Neurological:      General: No focal deficit present. Mental Status: She is alert. ASSESSMENT and PLAN  Diagnoses and all orders for this visit:    1. Essential hypertension  -     hydroCHLOROthiazide (HYDRODIURIL) 25 mg tablet; Take 1 Tablet by mouth daily. -     amLODIPine (NORVASC) 5 mg tablet; Take 1 Tablet by mouth daily.  -     CBC WITH AUTOMATED DIFF; Future  -     LIPID PANEL; Future  -     METABOLIC PANEL, COMPREHENSIVE; Future  -     TSH 3RD GENERATION; Future  -     HEMOGLOBIN A1C WITH EAG; Future    2. LAD (lymphadenopathy) of right cervical region  -     cephALEXin (KEFLEX) 500 mg capsule; Take 1 Capsule by mouth two (2) times a day for 5 days.       39year old female with uncontrolled hypertension, we will add amlodipine  Check labs today  There is a right cervical lymph node, likely reactive, will start keflex,  Return in 1 month for reevaluation

## 2021-06-10 NOTE — PROGRESS NOTES
Coordination of Care  1. Have you been to the ER, urgent care clinic since your last visit? Hospitalized since your last visit? No    2. Have you seen or consulted any other health care providers outside of the [address] [place] [address] since your last visit? Include any pap smears or colon screening. No    Does the patient need refills? YES    Learning Assessment Complete?  yes

## 2021-06-11 LAB
ALBUMIN SERPL-MCNC: 3.7 G/DL (ref 3.5–5)
ALBUMIN/GLOB SERPL: 1.2 {RATIO} (ref 1.1–2.2)
ALP SERPL-CCNC: 59 U/L (ref 45–117)
ALT SERPL-CCNC: 21 U/L (ref 12–78)
ANION GAP SERPL CALC-SCNC: 6 MMOL/L (ref 5–15)
AST SERPL-CCNC: 20 U/L (ref 15–37)
BASOPHILS # BLD: 0 K/UL (ref 0–0.1)
BASOPHILS NFR BLD: 1 % (ref 0–1)
BILIRUB SERPL-MCNC: 0.2 MG/DL (ref 0.2–1)
BUN SERPL-MCNC: 10 MG/DL (ref 6–20)
BUN/CREAT SERPL: 14 (ref 12–20)
CALCIUM SERPL-MCNC: 9.1 MG/DL (ref 8.5–10.1)
CHLORIDE SERPL-SCNC: 104 MMOL/L (ref 97–108)
CHOLEST SERPL-MCNC: 190 MG/DL
CO2 SERPL-SCNC: 29 MMOL/L (ref 21–32)
CREAT SERPL-MCNC: 0.72 MG/DL (ref 0.55–1.02)
DIFFERENTIAL METHOD BLD: ABNORMAL
EOSINOPHIL # BLD: 0.2 K/UL (ref 0–0.4)
EOSINOPHIL NFR BLD: 5 % (ref 0–7)
ERYTHROCYTE [DISTWIDTH] IN BLOOD BY AUTOMATED COUNT: 13.6 % (ref 11.5–14.5)
EST. AVERAGE GLUCOSE BLD GHB EST-MCNC: 105 MG/DL
GLOBULIN SER CALC-MCNC: 3.1 G/DL (ref 2–4)
GLUCOSE SERPL-MCNC: 76 MG/DL (ref 65–100)
HBA1C MFR BLD: 5.3 % (ref 4–5.6)
HCT VFR BLD AUTO: 41.1 % (ref 35–47)
HDLC SERPL-MCNC: 45 MG/DL
HDLC SERPL: 4.2 {RATIO} (ref 0–5)
HGB BLD-MCNC: 12.9 G/DL (ref 11.5–16)
IMM GRANULOCYTES # BLD AUTO: 0 K/UL (ref 0–0.04)
IMM GRANULOCYTES NFR BLD AUTO: 0 % (ref 0–0.5)
LDLC SERPL CALC-MCNC: 126.4 MG/DL (ref 0–100)
LYMPHOCYTES # BLD: 2.1 K/UL (ref 0.8–3.5)
LYMPHOCYTES NFR BLD: 46 % (ref 12–49)
MCH RBC QN AUTO: 26.1 PG (ref 26–34)
MCHC RBC AUTO-ENTMCNC: 31.4 G/DL (ref 30–36.5)
MCV RBC AUTO: 83 FL (ref 80–99)
MONOCYTES # BLD: 0.5 K/UL (ref 0–1)
MONOCYTES NFR BLD: 10 % (ref 5–13)
NEUTS SEG # BLD: 1.7 K/UL (ref 1.8–8)
NEUTS SEG NFR BLD: 38 % (ref 32–75)
NRBC # BLD: 0 K/UL (ref 0–0.01)
NRBC BLD-RTO: 0 PER 100 WBC
PLATELET # BLD AUTO: 310 K/UL (ref 150–400)
PMV BLD AUTO: 11.5 FL (ref 8.9–12.9)
POTASSIUM SERPL-SCNC: 3.2 MMOL/L (ref 3.5–5.1)
PROT SERPL-MCNC: 6.8 G/DL (ref 6.4–8.2)
RBC # BLD AUTO: 4.95 M/UL (ref 3.8–5.2)
SODIUM SERPL-SCNC: 139 MMOL/L (ref 136–145)
TRIGL SERPL-MCNC: 93 MG/DL (ref ?–150)
TSH SERPL DL<=0.05 MIU/L-ACNC: 1.61 UIU/ML (ref 0.36–3.74)
VLDLC SERPL CALC-MCNC: 18.6 MG/DL
WBC # BLD AUTO: 4.5 K/UL (ref 3.6–11)

## 2021-06-14 NOTE — PROGRESS NOTES
Labs are normal except low potassium.   Needs to increase potassium rich foods in the diet (mainly fruits and vegetables)    She needs to keep the follow up appointment so that we can recheck  Patient can be informed

## 2021-06-29 NOTE — PROGRESS NOTES
Message sent to registrar to schedule patient for follow up appointment per Laila Leonard.  Anastasiia Sanford RN

## 2021-06-29 NOTE — PROGRESS NOTES
Left a generic message for patient today asking the patient to call back to speak with a nurse for message from provider.      Jono Alexandra RN

## 2021-06-30 NOTE — PROGRESS NOTES
Letter mailed to pt with labs and message from provider. Patient given phone number of call back nurse to call if patient wants to discuss message in letter from provider.  Huy Izquierdo RN

## 2021-07-01 ENCOUNTER — OFFICE VISIT (OUTPATIENT)
Dept: FAMILY MEDICINE CLINIC | Age: 46
End: 2021-07-01

## 2021-07-01 VITALS
SYSTOLIC BLOOD PRESSURE: 146 MMHG | OXYGEN SATURATION: 98 % | HEART RATE: 89 BPM | BODY MASS INDEX: 35.04 KG/M2 | HEIGHT: 61 IN | DIASTOLIC BLOOD PRESSURE: 94 MMHG | TEMPERATURE: 98.2 F | WEIGHT: 185.6 LBS

## 2021-07-01 DIAGNOSIS — I10 ESSENTIAL HYPERTENSION: Primary | ICD-10-CM

## 2021-07-01 DIAGNOSIS — M25.561 ARTHRALGIA OF BOTH KNEES: ICD-10-CM

## 2021-07-01 DIAGNOSIS — M25.562 ARTHRALGIA OF BOTH KNEES: ICD-10-CM

## 2021-07-01 PROCEDURE — 99213 OFFICE O/P EST LOW 20 MIN: CPT | Performed by: FAMILY MEDICINE

## 2021-07-01 RX ORDER — DICLOFENAC SODIUM 10 MG/G
4 GEL TOPICAL 3 TIMES DAILY
Qty: 100 G | Refills: 2 | Status: SHIPPED | OUTPATIENT
Start: 2021-07-01 | End: 2022-07-29 | Stop reason: SDUPTHER

## 2021-07-01 RX ORDER — TRIAMTERENE/HYDROCHLOROTHIAZID 37.5-25 MG
1 TABLET ORAL DAILY
Qty: 90 TABLET | Refills: 3 | Status: SHIPPED | OUTPATIENT
Start: 2021-07-01 | End: 2022-07-26 | Stop reason: SDUPTHER

## 2021-07-01 NOTE — PROGRESS NOTES
Coordination of Care  1. Have you been to the ER, urgent care clinic since your last visit? Hospitalized since your last visit? No    2. Have you seen or consulted any other health care providers outside of the [address] [place] [address] since your last visit? Include any pap smears or colon screening. No    Does the patient need refills? NO    Learning Assessment Complete?  yes  Depression Screening complete in the past 12 months? yes

## 2021-07-01 NOTE — PROGRESS NOTES
HISTORY OF PRESENT ILLNESS  Simran Ponce is a 39 y.o. female. HPI  Patient states she is doing well. She is aware her potassium levels were a little low and has increased her intake. She also mentions she is going to a physician in [address] [place] [address] for weight loss and is receiving weight loss medications. She is having bilateral knee joint pain too,  It has gotten worse over the past few weeks. Would like to use a topical medication for the knee joints. Review of Systems   Constitutional: Negative for chills, fever and weight loss. Respiratory: Negative for cough, sputum production and shortness of breath. Cardiovascular: Negative for chest pain, palpitations and orthopnea. Musculoskeletal: Positive for joint pain. Knee joint pain   BP (!) 146/94 (BP 1 Location: Right arm, BP Patient Position: Sitting)   Pulse 89   Temp 98.2 °F (36.8 °C)   Ht 5' 1.42\" (1.56 m)   Wt 185 lb 9.6 oz (84.2 kg)   LMP 06/08/2021   SpO2 98%   BMI 34.59 kg/m²   Physical Exam  Constitutional:       General: She is not in acute distress. Appearance: Normal appearance. HENT:      Head: Normocephalic. Right Ear: Tympanic membrane normal.      Left Ear: Tympanic membrane normal.      Nose: Nose normal. No congestion. Mouth/Throat:      Mouth: Mucous membranes are moist.      Pharynx: No oropharyngeal exudate. Eyes:      Pupils: Pupils are equal, round, and reactive to light. Cardiovascular:      Rate and Rhythm: Normal rate and regular rhythm. Pulses: Normal pulses. Heart sounds: Normal heart sounds. No murmur heard. Pulmonary:      Effort: Pulmonary effort is normal.      Breath sounds: Normal breath sounds. No wheezing or rhonchi. Abdominal:      General: Bowel sounds are normal. There is no distension. Palpations: Abdomen is soft. Tenderness: There is no abdominal tenderness. Hernia: No hernia is present.    Musculoskeletal:         General: No swelling or tenderness. Normal range of motion. Neurological:      General: No focal deficit present. Mental Status: She is alert. ASSESSMENT and PLAN  Diagnoses and all orders for this visit:    1. Essential hypertension  -     triamterene-hydroCHLOROthiazide (MAXZIDE) 37.5-25 mg per tablet; Take 1 Tablet by mouth daily. 2. Arthralgia of both knees  -     diclofenac (VOLTAREN) 1 % gel; Apply 4 g to affected area three (3) times daily.  Knees X 10 days      39year old with hypertension, uncontrolled, we will switch to trimaterene-hctz, continue amlodipine  Bilateral knee joint pain,  May use diclofenac gel as needed  Follow up in 3 months

## 2021-07-01 NOTE — PROGRESS NOTES
Avs discussed with Misti Stock by Discharge Nurse Kym Godoy LPN. Discussed medication prescribed today. Patient verbalized understanding and has no further questions.  AVS printed and given to patient Kym Godoy LPN

## 2021-08-05 ENCOUNTER — TRANSCRIBE ORDER (OUTPATIENT)
Dept: SCHEDULING | Age: 46
End: 2021-08-05

## 2021-08-05 DIAGNOSIS — Z12.31 VISIT FOR SCREENING MAMMOGRAM: Primary | ICD-10-CM

## 2021-09-13 ENCOUNTER — HOSPITAL ENCOUNTER (OUTPATIENT)
Dept: MAMMOGRAPHY | Age: 46
Discharge: HOME | End: 2021-09-13
Attending: FAMILY MEDICINE

## 2021-09-13 DIAGNOSIS — Z12.31 VISIT FOR SCREENING MAMMOGRAM: ICD-10-CM

## 2021-09-13 PROCEDURE — 77067 SCR MAMMO BI INCL CAD: CPT

## 2022-04-26 ENCOUNTER — TELEPHONE (OUTPATIENT)
Dept: FAMILY MEDICINE CLINIC | Age: 47
End: 2022-04-26

## 2022-04-26 NOTE — TELEPHONE ENCOUNTER
Patient called and left a message in main office voicemail. I returned her call, no answer, voicemail message left as well.

## 2022-07-26 ENCOUNTER — OFFICE VISIT (OUTPATIENT)
Dept: FAMILY MEDICINE CLINIC | Age: 47
End: 2022-07-26

## 2022-07-26 ENCOUNTER — HOSPITAL ENCOUNTER (OUTPATIENT)
Dept: LAB | Age: 47
Discharge: HOME | End: 2022-07-26

## 2022-07-26 VITALS
BODY MASS INDEX: 37.19 KG/M2 | HEIGHT: 61 IN | OXYGEN SATURATION: 98 % | SYSTOLIC BLOOD PRESSURE: 170 MMHG | WEIGHT: 197 LBS | DIASTOLIC BLOOD PRESSURE: 97 MMHG | TEMPERATURE: 98.1 F | HEART RATE: 75 BPM

## 2022-07-26 DIAGNOSIS — I10 ESSENTIAL HYPERTENSION: ICD-10-CM

## 2022-07-26 LAB
ALBUMIN SERPL-MCNC: 3.7 G/DL (ref 3.5–5)
ALBUMIN/GLOB SERPL: 1 {RATIO} (ref 1.1–2.2)
ALP SERPL-CCNC: 62 U/L (ref 45–117)
ALT SERPL-CCNC: 15 U/L (ref 12–78)
ANION GAP SERPL CALC-SCNC: 7 MMOL/L (ref 5–15)
AST SERPL-CCNC: 20 U/L (ref 15–37)
BILIRUB SERPL-MCNC: 0.4 MG/DL (ref 0.2–1)
BUN SERPL-MCNC: 11 MG/DL (ref 6–20)
BUN/CREAT SERPL: 14 (ref 12–20)
CALCIUM SERPL-MCNC: 8.9 MG/DL (ref 8.5–10.1)
CHLORIDE SERPL-SCNC: 107 MMOL/L (ref 97–108)
CHOLEST SERPL-MCNC: 214 MG/DL
CO2 SERPL-SCNC: 27 MMOL/L (ref 21–32)
CREAT SERPL-MCNC: 0.78 MG/DL (ref 0.55–1.02)
EST. AVERAGE GLUCOSE BLD GHB EST-MCNC: 105 MG/DL
GLOBULIN SER CALC-MCNC: 3.7 G/DL (ref 2–4)
GLUCOSE SERPL-MCNC: 91 MG/DL (ref 65–100)
HBA1C MFR BLD: 5.3 % (ref 4–5.6)
HDLC SERPL-MCNC: 52 MG/DL
HDLC SERPL: 4.1 {RATIO} (ref 0–5)
LDLC SERPL CALC-MCNC: 132 MG/DL (ref 0–100)
POTASSIUM SERPL-SCNC: 4.6 MMOL/L (ref 3.5–5.1)
PROT SERPL-MCNC: 7.4 G/DL (ref 6.4–8.2)
SODIUM SERPL-SCNC: 141 MMOL/L (ref 136–145)
TRIGL SERPL-MCNC: 150 MG/DL (ref ?–150)
VLDLC SERPL CALC-MCNC: 30 MG/DL

## 2022-07-26 PROCEDURE — 83036 HEMOGLOBIN GLYCOSYLATED A1C: CPT

## 2022-07-26 PROCEDURE — 80053 COMPREHEN METABOLIC PANEL: CPT

## 2022-07-26 PROCEDURE — 99214 OFFICE O/P EST MOD 30 MIN: CPT | Performed by: FAMILY MEDICINE

## 2022-07-26 PROCEDURE — 80061 LIPID PANEL: CPT

## 2022-07-26 RX ORDER — AMLODIPINE BESYLATE 10 MG/1
10 TABLET ORAL DAILY
Qty: 30 TABLET | Refills: 1 | Status: SHIPPED | OUTPATIENT
Start: 2022-07-26 | End: 2022-08-29 | Stop reason: SDUPTHER

## 2022-07-26 RX ORDER — TRIAMTERENE/HYDROCHLOROTHIAZID 37.5-25 MG
1 TABLET ORAL DAILY
Qty: 90 TABLET | Refills: 3 | Status: SHIPPED | OUTPATIENT
Start: 2022-07-26

## 2022-07-26 NOTE — PROGRESS NOTES
Francisco Figueroa is a 42 y.o. female   Chief Complaint   Patient presents with    Hypertension     Pt states she has chest pressure on and off with left arm pain radiating to her left leg on and off    Medication Refill     Al meds         ASSESSMENT AND PLAN:    1. Essential hypertension  Symptomatic, but not currently. Pt will restart BP meds today. Follow up in 3 days for repeat BP. Pt advised to go immediately to the ED for ANY worsening symptoms, particularly chest pain. - triamterene-hydroCHLOROthiazide (MAXZIDE) 37.5-25 mg per tablet; Take 1 Tablet by mouth in the morning. Dispense: 90 Tablet; Refill: 3  - amLODIPine (NORVASC) 10 mg tablet; Take 1 Tablet by mouth in the morning. Dispense: 30 Tablet; Refill: 1  - LIPID PANEL; Future  - HEMOGLOBIN A1C WITH EAG; Future  - METABOLIC PANEL, COMPREHENSIVE; Future    SUBJECTIVE:    HPI:  Francisco Figueroa is a 42 y.o. female who presents  for hypertension follow-up. She has been out of her medications for several months. At her last appointment over a year ago, her BP medications were switched. She got confused about her follow-up appointment and never had it. She decided to schedule followup because she has been feeling heaviness in her chest, swelling in her legs, and some pain in her left arm on and off. She has not noticed that it is worse with activity necessarily. It comes and goes randomly. He has had headaches that respond to tylenol and some dizziness. She does not currently have chest pain. They are planning on taking an uber home (She is here with her 68 yo mother and young daughter). She reports she has had high blood sugar and high cholesterol but was told to watch her diet. She does not know what is currently going on. Review of Systems   Constitutional:  Negative for diaphoresis, fever and malaise/fatigue. Eyes:  Negative for blurred vision. Respiratory:  Positive for shortness of breath. Negative for cough. Cardiovascular:  Positive for chest pain and leg swelling. Negative for palpitations. Gastrointestinal:  Negative for abdominal pain, constipation, diarrhea, nausea and vomiting. Genitourinary: Negative. Musculoskeletal:  Positive for myalgias. Neurological:  Positive for dizziness and headaches. BP (!) 170/97   Pulse 75   Temp 98.1 °F (36.7 °C) (Temporal)   Ht 5' 1.02\" (1.55 m)   Wt 197 lb (89.4 kg)   LMP 07/24/2022   SpO2 98%   BMI 37.19 kg/m²     Physical Exam  Constitutional:       General: She is not in acute distress. Appearance: Normal appearance. Eyes:      Extraocular Movements: Extraocular movements intact. Conjunctiva/sclera: Conjunctivae normal.      Pupils: Pupils are equal, round, and reactive to light. Cardiovascular:      Rate and Rhythm: Normal rate and regular rhythm. Heart sounds: Normal heart sounds. Pulmonary:      Effort: Pulmonary effort is normal.      Breath sounds: Normal breath sounds. Musculoskeletal:         General: Tenderness (belly of tricep) present. Neurological:      Mental Status: She is alert and oriented to person, place, and time.    Psychiatric:         Behavior: Behavior normal.

## 2022-07-26 NOTE — PROGRESS NOTES
Verified name and . An AVS was printed and given to the patient. Reviewed all discharge instructions, including: new medications, continuing medications, possible side effects, [facility] [facility] discount program, [facility] scheduling instructions, and f/up appt. Explained s/s of high blood pressure emergency, told pt she needed to go to the ER if she experienced sudden onset CP, dizziness, stroke-like s/s. Allowed time for questions and patient verbalized understanding to all given instructions. Patient discharged from clinic in stable condition.

## 2022-07-26 NOTE — PROGRESS NOTES
Coordination of Care  1. Have you been to the ER, urgent care clinic since your last visit? Hospitalized since your last visit? No    2. Have you seen or consulted any other health care providers outside of the [address] [place] [address] since your last visit? Include any pap smears or colon screening. No    Does the patient need refills? YES    Learning Assessment Complete?  yes  Depression Screening complete in the past 12 months? yes

## 2022-07-29 ENCOUNTER — OFFICE VISIT (OUTPATIENT)
Dept: FAMILY MEDICINE CLINIC | Age: 47
End: 2022-07-29

## 2022-07-29 VITALS
HEART RATE: 94 BPM | BODY MASS INDEX: 37 KG/M2 | WEIGHT: 196 LBS | DIASTOLIC BLOOD PRESSURE: 102 MMHG | SYSTOLIC BLOOD PRESSURE: 169 MMHG | TEMPERATURE: 98.1 F | OXYGEN SATURATION: 98 %

## 2022-07-29 DIAGNOSIS — M25.561 ARTHRALGIA OF BOTH KNEES: ICD-10-CM

## 2022-07-29 DIAGNOSIS — M25.562 ARTHRALGIA OF BOTH KNEES: ICD-10-CM

## 2022-07-29 DIAGNOSIS — I10 ESSENTIAL HYPERTENSION: Primary | ICD-10-CM

## 2022-07-29 PROCEDURE — 99214 OFFICE O/P EST MOD 30 MIN: CPT | Performed by: FAMILY MEDICINE

## 2022-07-29 RX ORDER — LISINOPRIL 10 MG/1
10 TABLET ORAL DAILY
Qty: 30 TABLET | Refills: 2 | Status: SHIPPED | OUTPATIENT
Start: 2022-07-29

## 2022-07-29 RX ORDER — DICLOFENAC SODIUM 10 MG/G
4 GEL TOPICAL 3 TIMES DAILY
Qty: 100 G | Refills: 2 | Status: SHIPPED | OUTPATIENT
Start: 2022-07-29

## 2022-07-29 NOTE — PROGRESS NOTES
Katharine Haynes is a 42 y.o. female   Chief Complaint   Patient presents with    Hypertension     Follow up         ASSESSMENT AND PLAN:    1. Essential hypertension  Continue maxzide and amlodipine  Add lisinopril  Follow up in 10days for BP check. - lisinopriL (PRINIVIL, ZESTRIL) 10 mg tablet; Take 1 Tablet by mouth in the morning. Indications: high blood pressure  Dispense: 30 Tablet; Refill: 2    2. Arthralgia of both knees  Knee stretches and strengthening  Wear knee pro at work  Topical diclofenac  - diclofenac (VOLTAREN) 1 % gel; Apply 4 g to affected area three (3) times daily. Knees X 10 days  Dispense: 100 g; Refill: 2    > Reviewed dietary changes for elevated LDL. SUBJECTIVE:    HPI:  Katharine Haynes is a 42 y.o. female who presents  for a BP check. She was seen 7/26 with elevated BP and chest pressure. We restarted her BP meds - maxzide and amlodipine. She is feeling better symptomatically but her BP is still high. She is feeling stressed out because she is moving apartments. 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 She has knee pads that she wears sometimes. She has mild knee pain and her knees crack a lot when she walks. Review of Systems   Constitutional:  Negative for fever and malaise/fatigue. Eyes:  Negative for blurred vision. Respiratory:  Negative for cough and shortness of breath. Cardiovascular:  Negative for chest pain, palpitations and leg swelling. Gastrointestinal:  Negative for abdominal pain, constipation, diarrhea, nausea and vomiting. Musculoskeletal:  Positive for joint pain. Neurological:  Negative for dizziness and headaches. BP (!) 169/102 (BP 1 Location: Right upper arm, BP Patient Position: Sitting, BP Cuff Size: Adult long)   Pulse 94   Temp 98.1 °F (36.7 °C) (Temporal)   Wt 196 lb (88.9 kg)   LMP 07/24/2022   SpO2 98%   BMI 37.00 kg/m²     Physical Exam  Constitutional:       General: She is not in acute distress. Appearance: Normal appearance. Eyes:      Extraocular Movements: Extraocular movements intact. Conjunctiva/sclera: Conjunctivae normal.      Pupils: Pupils are equal, round, and reactive to light. Cardiovascular:      Rate and Rhythm: Normal rate and regular rhythm. Heart sounds: Normal heart sounds. Pulmonary:      Effort: Pulmonary effort is normal.      Breath sounds: Normal breath sounds. Musculoskeletal:      Right knee: Tenderness present. Left knee: Tenderness present. Neurological:      Mental Status: She is alert.

## 2022-07-29 NOTE — PROGRESS NOTES
Coordination of Care  1. Have you been to the ER, urgent care clinic since your last visit? Hospitalized since your last visit? No    2. Have you seen or consulted any other health care providers outside of the [address] [place] [address] since your last visit? Include any pap smears or colon screening. No    Does the patient need refills? N/A    Learning Assessment Complete?  yes

## 2022-07-29 NOTE — PROGRESS NOTES
Jane Mcgee seen at d/c, full name and  verified, given After visit Summary and reviewed today's visit with patient along with instructions on when it is recommended to come back. I reviewed the medication list with the patient to ensure she knows how to and when to take her medications. Lisinopril Side effects, mechanisms of action and medication compliance were reiterated to ensure proper understanding. Patient was given a [employer] discount card for her medications that were sent to [facility]. Patient was advised to show discount card before paying at the pharmacy register at [facility]. I have reviewed the provider's instructions with the patient, answering all questions to her satisfaction. Patient verbalized understanding.   Cordelia Shelton RN

## 2022-08-29 ENCOUNTER — OFFICE VISIT (OUTPATIENT)
Dept: FAMILY MEDICINE CLINIC | Age: 47
End: 2022-08-29

## 2022-08-29 VITALS
HEART RATE: 71 BPM | WEIGHT: 200 LBS | TEMPERATURE: 97.8 F | BODY MASS INDEX: 37.76 KG/M2 | OXYGEN SATURATION: 100 % | SYSTOLIC BLOOD PRESSURE: 135 MMHG | DIASTOLIC BLOOD PRESSURE: 75 MMHG

## 2022-08-29 DIAGNOSIS — I10 ESSENTIAL HYPERTENSION: Primary | ICD-10-CM

## 2022-08-29 DIAGNOSIS — E78.2 MIXED HYPERLIPIDEMIA: ICD-10-CM

## 2022-08-29 PROCEDURE — 99213 OFFICE O/P EST LOW 20 MIN: CPT | Performed by: FAMILY MEDICINE

## 2022-08-29 RX ORDER — AMLODIPINE BESYLATE 10 MG/1
10 TABLET ORAL DAILY
Qty: 90 TABLET | Refills: 1 | Status: SHIPPED | OUTPATIENT
Start: 2022-08-29

## 2022-08-29 NOTE — PROGRESS NOTES
Francisco Figueroa is a 42 y.o. female   Chief Complaint   Patient presents with    Medication Refill    Hypertension     Follow up         ASSESSMENT AND PLAN:    1. Essential hypertension  Much improved today. Continue 4 drug regimen: Lisinopril 10mg, Amlodipine 10mg, Triamterene/HCTZ 37.5/25  Continue lifestyle changes. - amLODIPine (NORVASC) 10 mg tablet; Take 1 Tablet by mouth daily. Dispense: 90 Tablet; Refill: 1    2. Mixed hyperlipidemia  Recheck lipids at next visit. Continue lifestyle changes. SUBJECTIVE:    HPI:  Francisco Figueroa is a 42 y.o. female who presents  for a BP check. At her last visit we added lisinopril. Pt reports she has not had any problems with the medications and that she is feeling good. She has occasional left arm pain if she leans on her elbow too long. She has been very conscious of her diet and has been exercising. No new concerns. Review of Systems   Constitutional:  Negative for fever and malaise/fatigue. Eyes:  Negative for blurred vision. Respiratory:  Negative for cough and shortness of breath. Cardiovascular:  Negative for chest pain, palpitations and leg swelling. Gastrointestinal:  Negative for abdominal pain, constipation, diarrhea, nausea and vomiting. Neurological:  Negative for dizziness and headaches. /75 (BP 1 Location: Right upper arm, BP Patient Position: Sitting, BP Cuff Size: Adult long)   Pulse 71   Temp 97.8 °F (36.6 °C) (Temporal)   Wt 200 lb (90.7 kg)   SpO2 100%   BMI 37.76 kg/m²     Physical Exam  Constitutional:       General: She is not in acute distress. Appearance: Normal appearance. Eyes:      Extraocular Movements: Extraocular movements intact. Conjunctiva/sclera: Conjunctivae normal.      Pupils: Pupils are equal, round, and reactive to light. Cardiovascular:      Rate and Rhythm: Normal rate and regular rhythm. Heart sounds: Normal heart sounds.    Pulmonary:      Effort: Pulmonary effort is normal.      Breath sounds: Normal breath sounds. Neurological:      Mental Status: She is alert.

## 2022-08-29 NOTE — PROGRESS NOTES
Coordination of Care  1. Have you been to the ER, urgent care clinic since your last visit? Hospitalized since your last visit? No    2. Have you seen or consulted any other health care providers outside of the [address] [place] [address] since your last visit? Include any pap smears or colon screening. No    Does the patient need refills? NO    Learning Assessment Complete?  yes

## 2022-08-29 NOTE — PROGRESS NOTES
Leanna Erickson seen at d/c, full name and  verified, given After visit Summary and reviewed today's visit with patient along with instructions on when it is recommended to come back.   Taya Goel RN

## 2023-02-06 ENCOUNTER — OFFICE VISIT (OUTPATIENT)
Dept: FAMILY MEDICINE CLINIC | Age: 48
End: 2023-02-06

## 2023-02-06 VITALS
BODY MASS INDEX: 37.43 KG/M2 | WEIGHT: 203.4 LBS | OXYGEN SATURATION: 97 % | TEMPERATURE: 97.9 F | DIASTOLIC BLOOD PRESSURE: 86 MMHG | SYSTOLIC BLOOD PRESSURE: 132 MMHG | HEIGHT: 62 IN | HEART RATE: 73 BPM

## 2023-02-06 DIAGNOSIS — N64.4 BREAST PAIN: Primary | ICD-10-CM

## 2023-02-06 PROCEDURE — 3075F SYST BP GE 130 - 139MM HG: CPT | Performed by: NURSE PRACTITIONER

## 2023-02-06 PROCEDURE — 99213 OFFICE O/P EST LOW 20 MIN: CPT | Performed by: NURSE PRACTITIONER

## 2023-02-06 PROCEDURE — 3079F DIAST BP 80-89 MM HG: CPT | Performed by: NURSE PRACTITIONER

## 2023-02-06 NOTE — PROGRESS NOTES
Coordination of Care  1. Have you been to the ER, urgent care clinic since your last visit? Hospitalized since your last visit? No    2. Have you seen or consulted any other health care providers outside of the [address] [place] [address] since your last visit? Include any pap smears or colon screening. No    Does the patient need refills? YES    Learning Assessment Complete?  yes  Depression Screening complete in the past 12 months? yes

## 2023-02-06 NOTE — PROGRESS NOTES
2023 : Margot Kumar (: 1975) is a 41 y.o. female,  established patient, here for evaluation of the following chief complaint(s):  Breast pain (Patient c/o pain on both breasts but worse on right breast.) and Back Pain (Patient c/o pulling pain/)   -dur for mammogram (and pap smear). ASSESSMENT/PLAN:  Below is the assessment and plan developed based on review of pertinent history, physical exam, labs, studies, and medications. 1. Breast pain  No follow-ups on file. Feels like bones are cracking when she goes up and down the stairs. Had operation not to have babies. When she stands up is when she has the back pain. SUBJECTIVE/OBJECTIVE:  HPI Breasts were hard and nipples sore. Feels the weight of the breaasts when she takes the bra off. Her last period was . A week after her period left she had this pain. Felt like when she was pregnant last time. Periods not regular. After first period didn't have a period for a year. By the third baby it was coming more frequently but like every other month. For her first baby at first she didn't get pregnant (age 23). Due for mammogram.  Breasts are not as hard as they were yesterday. No results found for any visits on 23. Current Medications:   Current Outpatient Medications   Medication Sig    amLODIPine (NORVASC) 10 mg tablet Take 1 Tablet by mouth daily. lisinopriL (PRINIVIL, ZESTRIL) 10 mg tablet Take 1 Tablet by mouth in the morning. Indications: high blood pressure    diclofenac (VOLTAREN) 1 % gel Apply 4 g to affected area three (3) times daily. Knees X 10 days    triamterene-hydroCHLOROthiazide (MAXZIDE) 37.5-25 mg per tablet Take 1 Tablet by mouth in the morning. Review of Systems: Negative for: fever, chest pain, shortness of breath, leg swelling. Social History:  reports that she has never smoked.  She has never used smokeless tobacco. She reports that she does not drink alcohol and does not use drugs. Physical Examination: Patient's last menstrual period was 01/07/2023 (approximate). Blood pressure 132/86, pulse 73, temperature 97.9 °F (36.6 °C), temperature source Temporal, height 5' 2.21\" (1.58 m), weight 203 lb 6.4 oz (92.3 kg), last menstrual period 01/07/2023, SpO2 97 %. General appearance - well developed, no acute distress. Chest - clear to auscultation. Heart - regular rate and rhythm without murmurs, rubs, or gallops. Abdomen - bowel sounds present x 4, NT, ND  Extremities - no CCE. Normal breast exam.  No adenopathy. No discrete masses. Tenderness low back midline. An electronic signature was used to authenticate this note.   -- Robert Lewis, YUE

## 2023-02-06 NOTE — PROGRESS NOTES
Patient discharged with AVS. Name and date of birth verified. [facility]) information given to the patient. Patient instructed to contact [facility] in regards to a mammogram and PAP smear appointment. Information on lower back pain exercises and fibrocystic breast changes included in the AVS. Patient was given an opportunity to voice questions/concerns. All questions were addressed. [facility] interpretor #[id number] assisted.

## 2023-02-14 ENCOUNTER — OFFICE VISIT (OUTPATIENT)
Dept: FAMILY MEDICINE CLINIC | Age: 48
End: 2023-02-14

## 2023-02-14 ENCOUNTER — HOSPITAL ENCOUNTER (OUTPATIENT)
Dept: LAB | Age: 48
Discharge: HOME | End: 2023-02-14

## 2023-02-14 VITALS
DIASTOLIC BLOOD PRESSURE: 99 MMHG | OXYGEN SATURATION: 99 % | TEMPERATURE: 97.3 F | HEART RATE: 73 BPM | SYSTOLIC BLOOD PRESSURE: 171 MMHG | BODY MASS INDEX: 37.73 KG/M2 | HEIGHT: 62 IN | WEIGHT: 205 LBS

## 2023-02-14 DIAGNOSIS — G89.29 RIGHT FLANK PAIN, CHRONIC: ICD-10-CM

## 2023-02-14 DIAGNOSIS — R10.11 RUQ PAIN: ICD-10-CM

## 2023-02-14 DIAGNOSIS — I10 ESSENTIAL HYPERTENSION: Primary | ICD-10-CM

## 2023-02-14 DIAGNOSIS — G89.29 CHRONIC BILATERAL LOW BACK PAIN WITHOUT SCIATICA: ICD-10-CM

## 2023-02-14 DIAGNOSIS — Z12.11 COLON CANCER SCREENING: ICD-10-CM

## 2023-02-14 DIAGNOSIS — I10 ESSENTIAL HYPERTENSION: ICD-10-CM

## 2023-02-14 DIAGNOSIS — R10.9 RIGHT FLANK PAIN, CHRONIC: ICD-10-CM

## 2023-02-14 DIAGNOSIS — M54.50 CHRONIC BILATERAL LOW BACK PAIN WITHOUT SCIATICA: ICD-10-CM

## 2023-02-14 PROCEDURE — 3077F SYST BP >= 140 MM HG: CPT | Performed by: FAMILY MEDICINE

## 2023-02-14 PROCEDURE — 3080F DIAST BP >= 90 MM HG: CPT | Performed by: FAMILY MEDICINE

## 2023-02-14 PROCEDURE — 99214 OFFICE O/P EST MOD 30 MIN: CPT | Performed by: FAMILY MEDICINE

## 2023-02-14 PROCEDURE — 82274 ASSAY TEST FOR BLOOD FECAL: CPT

## 2023-02-14 RX ORDER — PANTOPRAZOLE SODIUM 40 MG/1
40 TABLET, DELAYED RELEASE ORAL
Qty: 30 TABLET | Refills: 0 | Status: SHIPPED | OUTPATIENT
Start: 2023-02-14 | End: 2023-02-17 | Stop reason: ALTCHOICE

## 2023-02-14 RX ORDER — LISINOPRIL 10 MG/1
10 TABLET ORAL DAILY
Qty: 90 TABLET | Refills: 3 | Status: SHIPPED | OUTPATIENT
Start: 2023-02-14

## 2023-02-14 RX ORDER — TRIAMTERENE/HYDROCHLOROTHIAZID 37.5-25 MG
1 TABLET ORAL DAILY
Qty: 90 TABLET | Refills: 3 | Status: SHIPPED | OUTPATIENT
Start: 2023-02-14

## 2023-02-14 RX ORDER — AMLODIPINE BESYLATE 10 MG/1
10 TABLET ORAL DAILY
Qty: 90 TABLET | Refills: 3 | Status: SHIPPED | OUTPATIENT
Start: 2023-02-14

## 2023-02-14 NOTE — PROGRESS NOTES
AVS printed and with the assistance of [place], reviewed discharge instructions with patient. Patient advised that prescriptions were sent to [facility] and [facility] card was given to patient. Patient was given supplies and instructions for the Fit kit/Occult blood testing. Patient indicated understanding and appreciated the service today.

## 2023-02-14 NOTE — PROGRESS NOTES
Appointment for RUQ ultrasound scheduled for Friday, 2/24 at 10:30am at [facility], [address] [facility], [address], [place] [address].  [phone]. Patient advised to arrive at 10:00am for 10:30 procedure. Patient advised to have nothing to eat or drink for 8 hours prior to procedure time. Patient indicated understanding and appreciated the service today.

## 2023-02-14 NOTE — PROGRESS NOTES
Topher Ospina is a 41 y.o. female   Chief Complaint   Patient presents with    Abdominal Pain     Patient c/o abdominal pain, bloating and pain on back at the same time. SOB    Medication Refill     Patient requesting refills on all medicine           ASSESSMENT AND PLAN:    1. Essential hypertension  Very high today off meds x 1 week. Resume meds. - LIPID PANEL; Future  - amLODIPine (NORVASC) 10 mg tablet; Take 1 Tablet by mouth daily. Dispense: 90 Tablet; Refill: 3  - lisinopriL (PRINIVIL, ZESTRIL) 10 mg tablet; Take 1 Tablet by mouth daily. Indications: high blood pressure  Dispense: 90 Tablet; Refill: 3  - triamterene-hydroCHLOROthiazide (MAXZIDE) 37.5-25 mg per tablet; Take 1 Tablet by mouth daily. Dispense: 90 Tablet; Refill: 3    2. RUQ pain  Labs and RUQUS. Start pantoprazole. - CBC WITH AUTOMATED DIFF; Future  - METABOLIC PANEL, COMPREHENSIVE; Future  - LIPASE; Future  - H PYLORI AG, STOOL; Future  - pantoprazole (PROTONIX) 40 mg tablet; Take 1 Tablet by mouth Daily (before breakfast). Dispense: 30 Tablet; Refill: 0  - US ABD LTD; Future    3. Right flank pain, chronic  Check UA.    - URINALYSIS W/ RFLX MICROSCOPIC; Future    4. Chronic bilateral low back pain without sciatica  OTC tylenol PRN. Stretching/strengthening. Consider PT or acupuncture. 5. Colon cancer screening  - OCCULT BLOOD IMMUNOASSAY,DIAGNOSTIC; Future      SUBJECTIVE:    HPI:  Topher Ospina is a 41 y.o. female who presents with constant RUQ pain that radiates to her Right flank. She has associated dizziness and nausea. She feels bloated and sometimes feels like she can't get enough air. No vomiting. Endorses occasional constipation. Occasional dysuria and frequency. At night she feels chills and has midline chest pain. She does not feel like it is worsened with foods. No blood in stool or urine. She has been out of her BP meds since last week.  She had an acute appointment and thought they'd be refilled but they weren't. Review of Systems   Constitutional:  Positive for chills. Negative for fever and malaise/fatigue. Eyes:  Negative for blurred vision. Respiratory:  Negative for cough and shortness of breath. Cardiovascular:  Positive for chest pain. Negative for palpitations and leg swelling. Gastrointestinal:  Positive for abdominal pain, constipation, heartburn and nausea. Negative for blood in stool, diarrhea and vomiting. Genitourinary:  Positive for dysuria, flank pain and frequency. Negative for hematuria and urgency. Musculoskeletal:  Positive for back pain. Neurological:  Negative for dizziness and headaches. BP (!) 171/99 (BP 1 Location: Right arm, BP Patient Position: Sitting)   Pulse 73   Temp 97.3 °F (36.3 °C) (Temporal)   Ht 5' 2.21\" (1.58 m)   Wt 205 lb (93 kg)   LMP 02/07/2023   SpO2 99%   BMI 37.25 kg/m²     Physical Exam  Constitutional:       General: She is not in acute distress. Appearance: Normal appearance. HENT:      Head: Normocephalic and atraumatic. Mouth/Throat:      Mouth: Mucous membranes are moist.      Pharynx: Oropharynx is clear. No oropharyngeal exudate or posterior oropharyngeal erythema. Eyes:      Extraocular Movements: Extraocular movements intact. Conjunctiva/sclera: Conjunctivae normal.      Pupils: Pupils are equal, round, and reactive to light. Cardiovascular:      Rate and Rhythm: Normal rate and regular rhythm. Pulses: Normal pulses. Heart sounds: Normal heart sounds. Pulmonary:      Effort: Pulmonary effort is normal.      Breath sounds: Normal breath sounds. Abdominal:      General: Bowel sounds are normal. There is no distension. Palpations: Abdomen is soft. Tenderness: There is abdominal tenderness (RUQ, epigastrium). There is no right CVA tenderness, left CVA tenderness or guarding. Musculoskeletal:         General: Normal range of motion. Cervical back: No tenderness. Lumbar back: Tenderness present. Negative right straight leg raise test and negative left straight leg raise test.      Right lower leg: No edema. Left lower leg: No edema. Lymphadenopathy:      Cervical: No cervical adenopathy. Skin:     General: Skin is warm. Neurological:      Mental Status: She is alert and oriented to person, place, and time.       Gait: Gait normal.      Deep Tendon Reflexes: Reflexes normal.   Psychiatric:         Behavior: Behavior normal.

## 2023-02-14 NOTE — PROGRESS NOTES
Coordination of Care  1. Have you been to the ER, urgent care clinic since your last visit? Hospitalized since your last visit? No    2. Have you seen or consulted any other health care providers outside of the [address] [place] [address] since your last visit? Include any pap smears or colon screening. No    Does the patient need refills? YES    Learning Assessment Complete?  yes  Depression Screening complete in the past 12 months? yes

## 2023-02-15 LAB
ALBUMIN SERPL-MCNC: 3.6 G/DL (ref 3.5–5)
ALBUMIN/GLOB SERPL: 1 (ref 1.1–2.2)
ALP SERPL-CCNC: 72 U/L (ref 45–117)
ALT SERPL-CCNC: 20 U/L (ref 12–78)
ANION GAP SERPL CALC-SCNC: 5 MMOL/L (ref 5–15)
APPEARANCE UR: CLEAR
AST SERPL-CCNC: 17 U/L (ref 15–37)
BASOPHILS # BLD: 0.1 K/UL (ref 0–0.1)
BASOPHILS NFR BLD: 1 % (ref 0–1)
BILIRUB SERPL-MCNC: 0.2 MG/DL (ref 0.2–1)
BILIRUB UR QL: NEGATIVE
BUN SERPL-MCNC: 12 MG/DL (ref 6–20)
BUN/CREAT SERPL: 17 (ref 12–20)
CALCIUM SERPL-MCNC: 8.6 MG/DL (ref 8.5–10.1)
CHLORIDE SERPL-SCNC: 108 MMOL/L (ref 97–108)
CHOLEST SERPL-MCNC: 215 MG/DL
CO2 SERPL-SCNC: 29 MMOL/L (ref 21–32)
COLOR UR: NORMAL
CREAT SERPL-MCNC: 0.72 MG/DL (ref 0.55–1.02)
DIFFERENTIAL METHOD BLD: ABNORMAL
EOSINOPHIL # BLD: 0.2 K/UL (ref 0–0.4)
EOSINOPHIL NFR BLD: 3 % (ref 0–7)
ERYTHROCYTE [DISTWIDTH] IN BLOOD BY AUTOMATED COUNT: 14.6 % (ref 11.5–14.5)
GLOBULIN SER CALC-MCNC: 3.5 G/DL (ref 2–4)
GLUCOSE SERPL-MCNC: 85 MG/DL (ref 65–100)
GLUCOSE UR STRIP.AUTO-MCNC: NEGATIVE MG/DL
HCT VFR BLD AUTO: 39.8 % (ref 35–47)
HDLC SERPL-MCNC: 50 MG/DL
HDLC SERPL: 4.3 (ref 0–5)
HGB BLD-MCNC: 12.5 G/DL (ref 11.5–16)
HGB UR QL STRIP: NEGATIVE
IMM GRANULOCYTES # BLD AUTO: 0 K/UL (ref 0–0.04)
IMM GRANULOCYTES NFR BLD AUTO: 0 % (ref 0–0.5)
KETONES UR QL STRIP.AUTO: NEGATIVE MG/DL
LDLC SERPL CALC-MCNC: 131.2 MG/DL (ref 0–100)
LEUKOCYTE ESTERASE UR QL STRIP.AUTO: NEGATIVE
LIPASE SERPL-CCNC: 153 U/L (ref 73–393)
LYMPHOCYTES # BLD: 2.6 K/UL (ref 0.8–3.5)
LYMPHOCYTES NFR BLD: 46 % (ref 12–49)
MCH RBC QN AUTO: 25.8 PG (ref 26–34)
MCHC RBC AUTO-ENTMCNC: 31.4 G/DL (ref 30–36.5)
MCV RBC AUTO: 82.1 FL (ref 80–99)
MONOCYTES # BLD: 0.5 K/UL (ref 0–1)
MONOCYTES NFR BLD: 9 % (ref 5–13)
NEUTS SEG # BLD: 2.4 K/UL (ref 1.8–8)
NEUTS SEG NFR BLD: 41 % (ref 32–75)
NITRITE UR QL STRIP.AUTO: NEGATIVE
NRBC # BLD: 0 K/UL (ref 0–0.01)
NRBC BLD-RTO: 0 PER 100 WBC
PH UR STRIP: 5.5 (ref 5–8)
PLATELET # BLD AUTO: 308 K/UL (ref 150–400)
PMV BLD AUTO: 11.1 FL (ref 8.9–12.9)
POTASSIUM SERPL-SCNC: 4 MMOL/L (ref 3.5–5.1)
PROT SERPL-MCNC: 7.1 G/DL (ref 6.4–8.2)
PROT UR STRIP-MCNC: NEGATIVE MG/DL
RBC # BLD AUTO: 4.85 M/UL (ref 3.8–5.2)
SODIUM SERPL-SCNC: 142 MMOL/L (ref 136–145)
SP GR UR REFRACTOMETRY: 1.02 (ref 1–1.03)
TRIGL SERPL-MCNC: 169 MG/DL (ref ?–150)
UROBILINOGEN UR QL STRIP.AUTO: 0.2 EU/DL (ref 0.2–1)
VLDLC SERPL CALC-MCNC: 33.8 MG/DL
WBC # BLD AUTO: 5.8 K/UL (ref 3.6–11)

## 2023-02-15 PROCEDURE — 87338 HPYLORI STOOL AG IA: CPT

## 2023-02-15 PROCEDURE — 36415 COLL VENOUS BLD VENIPUNCTURE: CPT

## 2023-02-15 PROCEDURE — 85025 COMPLETE CBC W/AUTO DIFF WBC: CPT

## 2023-02-15 PROCEDURE — 83690 ASSAY OF LIPASE: CPT

## 2023-02-15 PROCEDURE — 80061 LIPID PANEL: CPT

## 2023-02-15 PROCEDURE — 81003 URINALYSIS AUTO W/O SCOPE: CPT

## 2023-02-15 PROCEDURE — 80053 COMPREHEN METABOLIC PANEL: CPT

## 2023-02-16 LAB — HEMOCCULT STL QL IA: NEGATIVE

## 2023-02-16 NOTE — PROGRESS NOTES
Her pancreas, kidney and liver function are normal.  She is not anemic and her white blood cell count is normal.  Her urine is normal.  Her cholesterol is essentially unchanged since 6 months ago - a little high. She has a RUQ US next week. We'll touch base after that. Thanks.

## 2023-02-17 ENCOUNTER — TELEPHONE (OUTPATIENT)
Dept: FAMILY MEDICINE CLINIC | Age: 48
End: 2023-02-17

## 2023-02-17 DIAGNOSIS — K29.70 HELICOBACTER PYLORI GASTRITIS: Primary | ICD-10-CM

## 2023-02-17 DIAGNOSIS — B96.81 HELICOBACTER PYLORI GASTRITIS: Primary | ICD-10-CM

## 2023-02-17 LAB
H PYLORI AG STL QL IA: POSITIVE
SPECIMEN SOURCE: ABNORMAL

## 2023-02-17 RX ORDER — PANTOPRAZOLE SODIUM 40 MG/1
40 TABLET, DELAYED RELEASE ORAL 2 TIMES DAILY
Qty: 28 TABLET | Refills: 0 | Status: SHIPPED | OUTPATIENT
Start: 2023-02-17 | End: 2023-03-03

## 2023-02-17 RX ORDER — CLARITHROMYCIN 500 MG/1
500 TABLET, FILM COATED ORAL 2 TIMES DAILY
Qty: 28 TABLET | Refills: 0 | Status: SHIPPED | OUTPATIENT
Start: 2023-02-17 | End: 2023-03-03

## 2023-02-17 RX ORDER — AMOXICILLIN 500 MG/1
1000 CAPSULE ORAL 2 TIMES DAILY
Qty: 56 CAPSULE | Refills: 0 | Status: SHIPPED | OUTPATIENT
Start: 2023-02-17 | End: 2023-03-03

## 2023-02-17 NOTE — TELEPHONE ENCOUNTER
Friday 6:40PM - went straight to . Left message I'd call back Monday. Monday 6:30PM - pt answered. She was at a [employer] with a lot of noise in the background but stated she understood the message and would complete the medications, hold the amlodipine and take 2 lisinopril. Text message sent with instructions, just to be sure. 1. Helicobacter pylori gastritis  GoodRx sent. Will need to hold amlodipine while taking clarithromycin due to medication interaction. During this time, take 2 lisinopril (20mg total) instead and continue maxzide. May resume amlodipine once H.Pylori treatment is completed (in 2 weeks)      - amoxicillin (AMOXIL) 500 mg capsule; Take 2 Capsules by mouth two (2) times a day for 14 days. Indications: inflammation of the stomach lining caused by H. pylori  Dispense: 56 Capsule; Refill: 0  - clarithromycin (BIAXIN) 500 mg tablet; Take 1 Tablet by mouth two (2) times a day for 14 days. Dispense: 28 Tablet; Refill: 0  - pantoprazole (PROTONIX) 40 mg tablet; Take 1 Tablet by mouth two (2) times a day for 14 days. Indications: inflammation of the stomach lining caused by H. pylori  Dispense: 28 Tablet;  Refill: 0      Normal CMP, Lipase, CBC, UA  Lipids Stable  Negative FIT test.

## 2023-02-22 ENCOUNTER — TELEPHONE (OUTPATIENT)
Dept: FAMILY MEDICINE CLINIC | Age: 48
End: 2023-02-22

## 2023-02-22 NOTE — TELEPHONE ENCOUNTER
Tc to the pt returning her call. She had left a message on the cbn phone. She verified her name and . Amn Int # [id number]. She is asking how to take her medication regimen for H Pylori. Her medications Amoxicillin and Clarithrymycin and Pantoprazole were reviewed how she is supposed to be taking them. They were ordered on 23.  Christiano Aden RN